# Patient Record
Sex: MALE | Race: WHITE | Employment: OTHER | URBAN - METROPOLITAN AREA
[De-identification: names, ages, dates, MRNs, and addresses within clinical notes are randomized per-mention and may not be internally consistent; named-entity substitution may affect disease eponyms.]

---

## 2017-01-19 ENCOUNTER — TELEPHONE (OUTPATIENT)
Dept: PEDIATRICS | Facility: CLINIC | Age: 82
End: 2017-01-19

## 2017-01-19 ENCOUNTER — OFFICE VISIT (OUTPATIENT)
Dept: PEDIATRICS | Facility: CLINIC | Age: 82
End: 2017-01-19
Payer: COMMERCIAL

## 2017-01-19 VITALS
OXYGEN SATURATION: 98 % | BODY MASS INDEX: 24.59 KG/M2 | HEIGHT: 69 IN | DIASTOLIC BLOOD PRESSURE: 71 MMHG | HEART RATE: 87 BPM | WEIGHT: 166 LBS | SYSTOLIC BLOOD PRESSURE: 117 MMHG | TEMPERATURE: 96.8 F

## 2017-01-19 DIAGNOSIS — T83.511A URINARY TRACT INFECTION ASSOCIATED WITH INDWELLING URETHRAL CATHETER, INITIAL ENCOUNTER (H): Primary | ICD-10-CM

## 2017-01-19 DIAGNOSIS — R30.0 DYSURIA: ICD-10-CM

## 2017-01-19 DIAGNOSIS — R39.9 UTI SYMPTOMS: ICD-10-CM

## 2017-01-19 DIAGNOSIS — N39.0 URINARY TRACT INFECTION ASSOCIATED WITH INDWELLING URETHRAL CATHETER, INITIAL ENCOUNTER (H): Primary | ICD-10-CM

## 2017-01-19 DIAGNOSIS — R82.90 NONSPECIFIC FINDING ON EXAMINATION OF URINE: ICD-10-CM

## 2017-01-19 DIAGNOSIS — R30.0 DYSURIA: Primary | ICD-10-CM

## 2017-01-19 DIAGNOSIS — N19 RENAL FAILURE: ICD-10-CM

## 2017-01-19 DIAGNOSIS — R53.83 FATIGUE, UNSPECIFIED TYPE: ICD-10-CM

## 2017-01-19 LAB
ALBUMIN UR-MCNC: 100 MG/DL
ANION GAP SERPL CALCULATED.3IONS-SCNC: 9 MMOL/L (ref 3–14)
APPEARANCE UR: ABNORMAL
BACTERIA #/AREA URNS HPF: ABNORMAL /HPF
BASOPHILS # BLD AUTO: 0 10E9/L (ref 0–0.2)
BASOPHILS NFR BLD AUTO: 0.3 %
BILIRUB UR QL STRIP: NEGATIVE
BUN SERPL-MCNC: 47 MG/DL (ref 7–30)
CALCIUM SERPL-MCNC: 9 MG/DL (ref 8.5–10.1)
CHLORIDE SERPL-SCNC: 110 MMOL/L (ref 94–109)
CO2 SERPL-SCNC: 21 MMOL/L (ref 20–32)
COLOR UR AUTO: YELLOW
CREAT SERPL-MCNC: 3.68 MG/DL (ref 0.66–1.25)
DIFFERENTIAL METHOD BLD: ABNORMAL
EOSINOPHIL # BLD AUTO: 0.1 10E9/L (ref 0–0.7)
EOSINOPHIL NFR BLD AUTO: 1.1 %
ERYTHROCYTE [DISTWIDTH] IN BLOOD BY AUTOMATED COUNT: 14 % (ref 10–15)
GFR SERPL CREATININE-BSD FRML MDRD: 16 ML/MIN/1.7M2
GLUCOSE SERPL-MCNC: 109 MG/DL (ref 70–99)
GLUCOSE UR STRIP-MCNC: NEGATIVE MG/DL
HCT VFR BLD AUTO: 38.5 % (ref 40–53)
HGB BLD-MCNC: 12.5 G/DL (ref 13.3–17.7)
HGB UR QL STRIP: ABNORMAL
KETONES UR STRIP-MCNC: NEGATIVE MG/DL
LEUKOCYTE ESTERASE UR QL STRIP: ABNORMAL
LYMPHOCYTES # BLD AUTO: 0.8 10E9/L (ref 0.8–5.3)
LYMPHOCYTES NFR BLD AUTO: 12.8 %
MCH RBC QN AUTO: 29.2 PG (ref 26.5–33)
MCHC RBC AUTO-ENTMCNC: 32.5 G/DL (ref 31.5–36.5)
MCV RBC AUTO: 90 FL (ref 78–100)
MONOCYTES # BLD AUTO: 1.1 10E9/L (ref 0–1.3)
MONOCYTES NFR BLD AUTO: 17.8 %
NEUTROPHILS # BLD AUTO: 4.2 10E9/L (ref 1.6–8.3)
NEUTROPHILS NFR BLD AUTO: 68 %
NITRATE UR QL: NEGATIVE
PH UR STRIP: 6 PH (ref 5–7)
PLATELET # BLD AUTO: 182 10E9/L (ref 150–450)
POTASSIUM SERPL-SCNC: 4.4 MMOL/L (ref 3.4–5.3)
RBC # BLD AUTO: 4.28 10E12/L (ref 4.4–5.9)
RBC #/AREA URNS AUTO: ABNORMAL /HPF (ref 0–2)
SODIUM SERPL-SCNC: 140 MMOL/L (ref 133–144)
SP GR UR STRIP: 1.01 (ref 1–1.03)
URN SPEC COLLECT METH UR: ABNORMAL
UROBILINOGEN UR STRIP-MCNC: NORMAL MG/DL (ref 0–2)
WBC # BLD AUTO: 6.2 10E9/L (ref 4–11)
WBC #/AREA URNS AUTO: >100 /HPF (ref 0–2)
WBC CLUMPS #/AREA URNS HPF: PRESENT /HPF

## 2017-01-19 PROCEDURE — 85025 COMPLETE CBC W/AUTO DIFF WBC: CPT | Performed by: INTERNAL MEDICINE

## 2017-01-19 PROCEDURE — 80048 BASIC METABOLIC PNL TOTAL CA: CPT | Performed by: INTERNAL MEDICINE

## 2017-01-19 PROCEDURE — 87088 URINE BACTERIA CULTURE: CPT | Performed by: INTERNAL MEDICINE

## 2017-01-19 PROCEDURE — 81001 URINALYSIS AUTO W/SCOPE: CPT | Performed by: INTERNAL MEDICINE

## 2017-01-19 PROCEDURE — 87086 URINE CULTURE/COLONY COUNT: CPT | Performed by: INTERNAL MEDICINE

## 2017-01-19 PROCEDURE — 99213 OFFICE O/P EST LOW 20 MIN: CPT | Mod: 25 | Performed by: INTERNAL MEDICINE

## 2017-01-19 PROCEDURE — 36415 COLL VENOUS BLD VENIPUNCTURE: CPT | Performed by: INTERNAL MEDICINE

## 2017-01-19 PROCEDURE — 87186 SC STD MICRODIL/AGAR DIL: CPT | Performed by: INTERNAL MEDICINE

## 2017-01-19 RX ORDER — CIPROFLOXACIN 250 MG/1
250 TABLET, FILM COATED ORAL DAILY
Qty: 10 TABLET | Refills: 0 | Status: SHIPPED | OUTPATIENT
Start: 2017-01-19 | End: 2017-01-29

## 2017-01-19 NOTE — NURSING NOTE
"Chief Complaint   Patient presents with     UTI       Initial /71 mmHg  Pulse 87  Temp(Src) 96.8  F (36  C) (Oral)  Ht 5' 8.5\" (1.74 m)  Wt 166 lb (75.297 kg)  BMI 24.87 kg/m2  SpO2 98% Estimated body mass index is 24.87 kg/(m^2) as calculated from the following:    Height as of this encounter: 5' 8.5\" (1.74 m).    Weight as of this encounter: 166 lb (75.297 kg).  BP completed using cuff size: shaquille Moseley CMA    "

## 2017-01-19 NOTE — PROGRESS NOTES
SUBJECTIVE:                                                    Cam Gonzalez is a 86 year old male who presents to clinic today for the following health issues:      Genitourinary - Male     Onset: Yesterday     Description:   Dysuria Painful uses cath   Hematuria (blood in urine): no   Frequency: YES  Are you urinating at night : Yes- night before last night  Hesitancy (delay in urine): no   Retention (unable to empty): YES- pt. States it wont empty completely   Decrease in urinary flow: no   Incontinence: no     Progression of Symptoms:  improving    Accompanying Signs & Symptoms:  Fever: no   Back/Flank pain: no   Urethral discharge: no   Testicle lumps/masses/pain: no   Nausea and/or vomiting: no   Abdominal pain: no    History:   History of frequent UTI's: no   History of kidney stones: no   History of hernias: no   Personal or Family history of Prostate problems: no  Sexually active: no            Therapies Tried and outcome: None      HPI: This gentleman self caths and noted discomfort and foul-smelling urine. No nausea no vomiting no fever no back pain    PMH:   Patient Active Problem List   Diagnosis     Renal failure     Syncope, unspecified syncope type     Acute renal failure, unspecified acute renal failure type (H)     Bilateral hydronephrosis     Asymptomatic cholelithiasis     Enlarged prostate     Mild concentric left ventricular hypertrophy (LVH)     Anemia of chronic disease     Vitamin D deficiency     Urinary retention     Elevated blood pressure reading without diagnosis of hypertension     Carotid stenosis, asymptomatic, bilateral     Sinus bradycardia     HTN (hypertension)     Second degree AV block, Mobitz type II     S/P placement of cardiac pacemaker     Syncope and collapse     CKD (chronic kidney disease) stage 4, GFR 15-29 ml/min (H)     Obstructive uropathy     ACP (advance care planning)     Hill catheter in place     S/P TURP (status post transurethral resection of  "prostate)     History reviewed. No pertinent past surgical history.    Social History   Substance Use Topics     Smoking status: Former Smoker     Quit date: 01/01/1990     Smokeless tobacco: Not on file     Alcohol Use: No      Comment: rarely has a beer     Family History   Problem Relation Age of Onset     Prostate Cancer Father          No Known Allergies        OBJECTIVE:                                                    /71 mmHg  Pulse 87  Temp(Src) 96.8  F (36  C) (Oral)  Ht 5' 8.5\" (1.74 m)  Wt 166 lb (75.297 kg)  BMI 24.87 kg/m2  SpO2 98%  Body mass index is 24.87 kg/(m^2).  GENERAL: healthy, alert and no distress  NEURO: Normal strength and tone, mentation intact and speech normal  BACK: no CVA tenderness, no paralumbar tenderness  PSYCH: mentation appears normal, affect normal/bright    Diagnostic Test Results:  Results for orders placed or performed in visit on 01/19/17 (from the past 24 hour(s))   CBC with platelets and differential   Result Value Ref Range    WBC 6.2 4.0 - 11.0 10e9/L    RBC Count 4.28 (L) 4.4 - 5.9 10e12/L    Hemoglobin 12.5 (L) 13.3 - 17.7 g/dL    Hematocrit 38.5 (L) 40.0 - 53.0 %    MCV 90 78 - 100 fl    MCH 29.2 26.5 - 33.0 pg    MCHC 32.5 31.5 - 36.5 g/dL    RDW 14.0 10.0 - 15.0 %    Platelet Count 182 150 - 450 10e9/L    Diff Method Automated Method     % Neutrophils 68.0 %    % Lymphocytes 12.8 %    % Monocytes 17.8 %    % Eosinophils 1.1 %    % Basophils 0.3 %    Absolute Neutrophil 4.2 1.6 - 8.3 10e9/L    Absolute Lymphocytes 0.8 0.8 - 5.3 10e9/L    Absolute Monocytes 1.1 0.0 - 1.3 10e9/L    Absolute Eosinophils 0.1 0.0 - 0.7 10e9/L    Absolute Basophils 0.0 0.0 - 0.2 10e9/L   Basic metabolic panel  (Ca, Cl, CO2, Creat, Gluc, K, Na, BUN)   Result Value Ref Range    Sodium 140 133 - 144 mmol/L    Potassium 4.4 3.4 - 5.3 mmol/L    Chloride 110 (H) 94 - 109 mmol/L    Carbon Dioxide 21 20 - 32 mmol/L    Anion Gap 9 3 - 14 mmol/L    Glucose 109 (H) 70 - 99 mg/dL    " Urea Nitrogen 47 (H) 7 - 30 mg/dL    Creatinine 3.68 (H) 0.66 - 1.25 mg/dL    GFR Estimate 16 (L) >60 mL/min/1.7m2    GFR Estimate If Black 19 (L) >60 mL/min/1.7m2    Calcium 9.0 8.5 - 10.1 mg/dL   UA with Microscopic reflex to Culture ([place])   Result Value Ref Range    Color Urine Yellow     Appearance Urine Cloudy     Glucose Urine Negative NEG mg/dL    Bilirubin Urine Negative NEG    Ketones Urine Negative NEG mg/dL    Specific Gravity Urine 1.015 1.003 - 1.035    Blood Urine Moderate (A) NEG    pH Urine 6.0 5.0 - 7.0 pH    Protein Albumin Urine 100 (A) NEG mg/dL    Urobilinogen mg/dL Normal 0.0 - 2.0 mg/dL    Nitrite Urine Negative NEG    Leukocyte Esterase Urine Large (A) NEG    Source Midstream Urine     WBC Urine >100 (A) 0 - 2 /HPF    RBC Urine 2-5 (A) 0 - 2 /HPF    WBC Clumps Present (A) NEG /HPF    Bacteria Urine Many (A) NEG /HPF        ASSESSMENT/PLAN:                                                    1. Urinary tract infection associated with indwelling urethral catheter, initial encounter    Uncomplicated urinary tract infection. Dose of Cipro adjusted for renal failure. Has appointment with urology on Monday.    - ciprofloxacin (CIPRO) 250 MG tablet; Take 1 tablet (250 mg) by mouth daily for 10 days  Dispense: 10 tablet; Refill: 0    2. Renal failure      3. Fatigue, unspecified type    - Basic metabolic panel  (Ca, Cl, CO2, Creat, Gluc, K, Na, BUN)    4. UTI symptoms      5. Dysuria    - CBC with platelets and differential  - UA with Microscopic reflex to Culture ([place])    6. Nonspecific finding on examination of urine    - Urine Culture Aerobic Bacterial    Will call or return to clinic if worsening or symptoms not improving as discussed.  See Patient Instructions    Mati Hurst MD  [facility]

## 2017-01-19 NOTE — TELEPHONE ENCOUNTER
[facility] Call Center    Phone Message    Name of Caller: Meagan    Phone Number: [phone]      Best time to return call: any    May a detailed message be left on voicemail: yes    Relation to patient: Self    Reason for Call: Patients daughter stated her dad has UTI symptoms, cloudy urine/odor, abdominal pain, asking if he can come in and leave a urine sample, self cathed,    Action Taken: Message routed to:  Primary Care p [phone]

## 2017-01-19 NOTE — LETTER
April 27, 2017      Cam Gonzalez  [address]  [place] [facility] [address]              Dear aCm,    In order to ensure that we are providing the best quality care, we would like to remind you that you are due for a lab appointment for the following labs UA and CBC that was recently ordered by . Please let us know if you have any questions and we would be happy to help.     Thank you for trusting us with your care.    Sincerely,     [facility]  [phone]

## 2017-01-19 NOTE — TELEPHONE ENCOUNTER
Date: 1/19/2017    Time of Call: 10:30 AM     Diagnosis:  Dysuria/symptoms of UTI     [ TORB ] Ordering provider: Dr. Hurst  Order: Please order CBC with diff and UA with micro     Order received by: Leydi Pickens RN       Follow-up/additional notes: Patient notified of labs prior to appointment.

## 2017-01-19 NOTE — TELEPHONE ENCOUNTER
Patient's daughter calling today to discuss concerns she has about her father.  She states that yesterday her dad called stating he wasn't feeling well.  Daughter, Meagan, states that her father complained of cloudy urine and a fowl odor.  Patient self cath's.  Patient denies fever or chills at this time.  Daughter mentioned that he did complain of some lower abdominal discomfort and back pain.   Denies nausea or vomiting.  Patient scheduled to be seen in clinic with Dr. Hurst today at 2:40 pm to further assess urinary symptoms.    Will route to provider to review.      Dr. Hurst, would you like lab orders placed for UA/UC prior to your office visit today?

## 2017-01-23 ENCOUNTER — TELEPHONE (OUTPATIENT)
Dept: PEDIATRICS | Facility: CLINIC | Age: 82
End: 2017-01-23

## 2017-01-23 ENCOUNTER — TRANSFERRED RECORDS (OUTPATIENT)
Dept: HEALTH INFORMATION MANAGEMENT | Facility: CLINIC | Age: 82
End: 2017-01-23

## 2017-01-23 LAB
BACTERIA SPEC CULT: ABNORMAL
MICRO REPORT STATUS: ABNORMAL
MICROORGANISM SPEC CULT: ABNORMAL
SPECIMEN SOURCE: ABNORMAL

## 2017-01-23 NOTE — TELEPHONE ENCOUNTER
Notes Recorded by Deborah Bautista APRN CNP on 1/23/2017 at 7:55 AM  Please call   -Urine culture is abnormal and grew out bacteria that are sensitive to the antibiotic you have been given.  Complete the medication as prescribed and if you experience new, worsening or persistent symptoms, you should call or return for a recheck.    Patient Contact    Attempt # 1    Was call answered?  No.  Left message on daughters home/cell number to return call to clinic.  Carlos Sanders, CMA

## 2017-01-24 NOTE — TELEPHONE ENCOUNTER
Phone number listed for patient is patient's daughter's, Meagan, phone number. No phone number listed for patient. No authorization to discuss with daughter on file.   Notified patient's daughter, Meagan, of lab result below. Daughter stated understanding and will have patient continue Cipro antibiotic that was given on 01/19/17.  Marci Blackman, [facility]

## 2017-02-24 ENCOUNTER — TRANSFERRED RECORDS (OUTPATIENT)
Dept: HEALTH INFORMATION MANAGEMENT | Facility: CLINIC | Age: 82
End: 2017-02-24

## 2017-02-28 ENCOUNTER — TRANSFERRED RECORDS (OUTPATIENT)
Dept: HEALTH INFORMATION MANAGEMENT | Facility: CLINIC | Age: 82
End: 2017-02-28

## 2017-03-23 ENCOUNTER — OFFICE VISIT (OUTPATIENT)
Dept: PEDIATRICS | Facility: CLINIC | Age: 82
End: 2017-03-23
Payer: COMMERCIAL

## 2017-03-23 ENCOUNTER — TELEPHONE (OUTPATIENT)
Dept: PEDIATRICS | Facility: CLINIC | Age: 82
End: 2017-03-23

## 2017-03-23 VITALS
BODY MASS INDEX: 23.77 KG/M2 | HEART RATE: 107 BPM | WEIGHT: 160.5 LBS | SYSTOLIC BLOOD PRESSURE: 114 MMHG | DIASTOLIC BLOOD PRESSURE: 73 MMHG | TEMPERATURE: 97.3 F | HEIGHT: 69 IN | OXYGEN SATURATION: 97 %

## 2017-03-23 DIAGNOSIS — N18.4 CKD (CHRONIC KIDNEY DISEASE) STAGE 4, GFR 15-29 ML/MIN (H): ICD-10-CM

## 2017-03-23 DIAGNOSIS — R19.7 DIARRHEA OF PRESUMED INFECTIOUS ORIGIN: Primary | ICD-10-CM

## 2017-03-23 LAB
C DIFF TOX B STL QL: ABNORMAL
SPECIMEN SOURCE: ABNORMAL

## 2017-03-23 PROCEDURE — 99213 OFFICE O/P EST LOW 20 MIN: CPT | Performed by: INTERNAL MEDICINE

## 2017-03-23 PROCEDURE — 87493 C DIFF AMPLIFIED PROBE: CPT | Performed by: INTERNAL MEDICINE

## 2017-03-23 NOTE — NURSING NOTE
"Chief Complaint   Patient presents with     Hospital F/U       Initial /73 (BP Location: Right arm, Patient Position: Chair, Cuff Size: Adult Regular)  Pulse 107  Temp 97.3  F (36.3  C) (Temporal)  Ht 5' 8.5\" (1.74 m)  Wt 160 lb 8 oz (72.8 kg)  SpO2 97%  BMI 24.05 kg/m2 Estimated body mass index is 24.05 kg/(m^2) as calculated from the following:    Height as of this encounter: 5' 8.5\" (1.74 m).    Weight as of this encounter: 160 lb 8 oz (72.8 kg).  Medication Reconciliation: complete     Angely Moseley, SHIRLEY    "

## 2017-03-23 NOTE — PATIENT INSTRUCTIONS
Treating Diarrhea  Diarrhea occurs when you have loose, watery, or frequent bowel movements. It is a common problem with many causes. Most cases of diarrhea clear up on their own. But certain cases may need treatment. Be sure to see your health care provider if your symptoms do not improve within a few days.    Getting Relief  Treatment of diarrhea depends on its cause. Diarrhea caused by bacterial or parasite infection is often treated with antibiotics. Diarrhea caused by other factors, such as a stomach virus, often improves with simple home treatment. The tips below may also help relieve your symptoms.    Drink plenty of fluids. This helps prevent too much fluid loss (dehydration). Water, clear soups, and electrolyte solutions are good choices. Avoid alcohol, coffee, tea, and milk. These can make symptoms worse.    Suck on ice chips if drinking makes you queasy.    Return to your normal diet slowly. You may want to eat bland foods at first, such as rice and toast. Also, you may need to avoid certain foods for a while, such as dairy products. These can make symptoms worse. Ask your health care provider if there are any other foods you should avoid.    If you were prescribed antibiotics, take them as directed.    Do not take anti-diarrhea medications without asking your health care provider first.  Call Your Health Care Provider If You Have:    Fever of 102 F (38.0 C) or higher    Severe pain    Worsening diarrhea or diarrhea for more than 2 days    Bloody vomit or stool    Signs of dehydration (dizziness, dry mouth and tongue, rapid pulse, dark urine)    4030-8663 [employer]. [address] [place] [address], [place] [address]. All rights reserved. This information is not intended as a substitute for professional medical care. Always follow your healthcare professional's instructions.      About C. diff Infection  For Patients, Family and Visitors  What is C. diff (clostridium difficile)?  C. diff are germs  (bacteria). These germs can live in the guts of healthy people. Antibiotic medicine can change the balance of germs in the gut, causing C. diff infection and diarrhea (dye-uh-ESTELLA-deborah).  You can also get C. diff infection during a hospital stay, after surgery, or if you have a weak immune system or IBD (inflammatory bowel disease).  What are the symptoms?  If you have these symptoms, your doctor will ask for a stool (poop) sample for testing:    Diarrhea (loose, watery stools)    Belly pain, tenderness and cramping    Fever  How does it spread?  C. diff leaves your body through your stool. You can get infected when :  1. You touch a surface that has this germ, then  2. You touch food or something else that you put in your mouth.  Here's how you, your family and visitors can help prevent the infection from spreading:     Wash hands often, especially in the hospital, after using the bathroom and before you eat.    Use antibiotics only when you need them. Don't ask for them if your doctor says you have a virus.    If you take antibiotics, follow the directions. Finish all the pills, even if you feel better.    If you have C. diff infection, try to use a separate restroom until you are well.    At home, clean countertops, sinks, faucets, bathroom doorknobs and toilets often. Use warm water with soap or cleaning products with bleach. (Don't use pure bleach. It's too strong.)    In the hospital, your care team should wear gloves and gowns. They should clean their hands before touching you and before leaving the room. If they don't, please remind them.  Hand washing  For this illness, soap and water works better than hand .    Wash hands with warm water and plenty of soap. Wash for 15 to 20 seconds.    Clean under nails, between fingers and up the wrists.    Rinse hands, letting water run down your fingers.    Dry hands well. Use a paper towel to turn off the faucet and open the door.   How is it treated?  Your  doctor may change your antibiotics and give you medicine for diarrhea. Don't take other anti-diarrhea medicines. They will make things worse.  You may get extra fluids through an IV (small tube in the arm or hand).  Sometimes, the infection will come back. If you have symptoms, please call your doctor.   For informational purposes only. Not to replace the advice of your health care provider. Copyright   2014 [employer]. All rights reserved. [employer] 857227 - REV 05/16.

## 2017-03-23 NOTE — MR AVS SNAPSHOT
After Visit Summary   3/23/2017    Cam Gonzalez    MRN: [id number]           Patient Information     Date Of Birth          2/5/1930        Visit Information        Provider Department      3/23/2017 9:00 AM Mati Hurst MD [facility]        Today's Diagnoses     Diarrhea of presumed infectious origin    -  1      Care Instructions      Treating Diarrhea  Diarrhea occurs when you have loose, watery, or frequent bowel movements. It is a common problem with many causes. Most cases of diarrhea clear up on their own. But certain cases may need treatment. Be sure to see your health care provider if your symptoms do not improve within a few days.    Getting Relief  Treatment of diarrhea depends on its cause. Diarrhea caused by bacterial or parasite infection is often treated with antibiotics. Diarrhea caused by other factors, such as a stomach virus, often improves with simple home treatment. The tips below may also help relieve your symptoms.    Drink plenty of fluids. This helps prevent too much fluid loss (dehydration). Water, clear soups, and electrolyte solutions are good choices. Avoid alcohol, coffee, tea, and milk. These can make symptoms worse.    Suck on ice chips if drinking makes you queasy.    Return to your normal diet slowly. You may want to eat bland foods at first, such as rice and toast. Also, you may need to avoid certain foods for a while, such as dairy products. These can make symptoms worse. Ask your health care provider if there are any other foods you should avoid.    If you were prescribed antibiotics, take them as directed.    Do not take anti-diarrhea medications without asking your health care provider first.  Call Your Health Care Provider If You Have:    Fever of 102 F (38.0 C) or higher    Severe pain    Worsening diarrhea or diarrhea for more than 2 days    Bloody vomit or stool    Signs of dehydration (dizziness, dry mouth and tongue, rapid  pulse, dark urine)    2297-4620 [employer]. [address] [place] [address], [place] [address]. All rights reserved. This information is not intended as a substitute for professional medical care. Always follow your healthcare professional's instructions.      About C. diff Infection  For Patients, Family and Visitors  What is C. diff (clostridium difficile)?  C. diff are germs (bacteria). These germs can live in the guts of healthy people. Antibiotic medicine can change the balance of germs in the gut, causing C. diff infection and diarrhea (dye-uh-REE-yuh).  You can also get C. diff infection during a hospital stay, after surgery, or if you have a weak immune system or IBD (inflammatory bowel disease).  What are the symptoms?  If you have these symptoms, your doctor will ask for a stool (poop) sample for testing:    Diarrhea (loose, watery stools)    Belly pain, tenderness and cramping    Fever  How does it spread?  C. diff leaves your body through your stool. You can get infected when :  1. You touch a surface that has this germ, then  2. You touch food or something else that you put in your mouth.  Here's how you, your family and visitors can help prevent the infection from spreading:     Wash hands often, especially in the hospital, after using the bathroom and before you eat.    Use antibiotics only when you need them. Don't ask for them if your doctor says you have a virus.    If you take antibiotics, follow the directions. Finish all the pills, even if you feel better.    If you have C. diff infection, try to use a separate restroom until you are well.    At home, clean countertops, sinks, faucets, bathroom doorknobs and toilets often. Use warm water with soap or cleaning products with bleach. (Don't use pure bleach. It's too strong.)    In the hospital, your care team should wear gloves and gowns. They should clean their hands before touching you and before leaving the room. If they don't, please  remind them.  Hand washing  For this illness, soap and water works better than hand .    Wash hands with warm water and plenty of soap. Wash for 15 to 20 seconds.    Clean under nails, between fingers and up the wrists.    Rinse hands, letting water run down your fingers.    Dry hands well. Use a paper towel to turn off the faucet and open the door.   How is it treated?  Your doctor may change your antibiotics and give you medicine for diarrhea. Don't take other anti-diarrhea medicines. They will make things worse.  You may get extra fluids through an IV (small tube in the arm or hand).  Sometimes, the infection will come back. If you have symptoms, please call your doctor.   For informational purposes only. Not to replace the advice of your health care provider. Copyright   2014 [employer]. All rights reserved. [employer] [id number] - REV 05/16.          Follow-ups after your visit        Follow-up notes from your care team     Return in about 1 month (around 4/23/2017).      Who to contact     If you have questions or need follow up information about today's clinic visit or your schedule please contact [facility] directly at [phone].  Normal or non-critical lab and imaging results will be communicated to you by MyChart, letter or phone within 4 business days after the clinic has received the results. If you do not hear from us within 7 days, please contact the clinic through [employer] or phone. If you have a critical or abnormal lab result, we will notify you by phone as soon as possible.  Submit refill requests through [employer] or call your pharmacy and they will forward the refill request to us. Please allow 3 business days for your refill to be completed.          Additional Information About Your Visit        [employer] Information     [employer] is an electronic gateway that provides easy, online access to your medical records. With [employer] you can request a clinic  "appointment, read your test results, renew a prescription or communicate with your care team.     To sign up for [employer] visit the website at [email or web address]   You will be asked to enter the access code listed below, as well as some personal information. Please follow the directions to create your username and password.     Your access code is: [email or web address]  Expires: 2017  9:37 AM     Your access code will  in 90 days. If you need help or a new code, please contact your [facility] or call [phone] for assistance.        Care EveryWhere ID     This is your Care EveryWhere ID. This could be used by other organizations to access your [place] medical records  [id number]        Your Vitals Were     Pulse Temperature Height Pulse Oximetry BMI (Body Mass Index)       107 97.3  F (36.3  C) (Temporal) 5' 8.5\" (1.74 m) 97% 24.05 kg/m2        Blood Pressure from Last 3 Encounters:   17 114/73   17 117/71   16 120/58    Weight from Last 3 Encounters:   17 160 lb 8 oz (72.8 kg)   17 166 lb (75.3 kg)   16 161 lb 6.4 oz (73.2 kg)              We Performed the Following     Clostridium difficile Toxin B PCR        Primary Care Provider Office Phone # Fax #    Jacquelin Cueto [phone]       [facility] [address] [facility] [address]        Thank you!     Thank you for choosing [facility]  for your care. Our goal is always to provide you with excellent care. Hearing back from our patients is one way we can continue to improve our services. Please take a few minutes to complete the written survey that you may receive in the mail after your visit with us. Thank you!             Your Updated Medication List - Protect others around you: Learn how to safely use, store and throw away your medicines at www.disposemymeds.org.          This list is accurate as of: 3/23/17  9:37 AM.  " Always use your most recent med list.                   Brand Name Dispense Instructions for use    ferrous sulfate 325 (65 FE) MG tablet    IRON    90 tablet    Take 1 tablet (325 mg) by mouth every other day       VITAMIN D3 PO      Take 2,000 Units by mouth daily

## 2017-03-23 NOTE — PROGRESS NOTES
SUBJECTIVE:                                                    Cam Gonzalez is a 87 year old male who presents to clinic today for the following health issues:      ED/UC Followup:    Facility:  [facility]   Date of visit: 2/28-3/3/2017  Reason for visit: Had a bladder infection and was put on an antibiotic a week before and it worsened and had diarrhea , diagnosed with Cdiff  Current Status: Pt. States it varies someday's better someday's worse     Pt. States he did bring a stool sample in today if needed.    HPI: This 87-year-old man returns after recent hospital hospitalization for urinary tract infection which was complicated by an episode of C. difficile infection treated with Flagyl. He feels better now but still having one to 2 loose stools daily    PMH:   Patient Active Problem List   Diagnosis     Renal failure     Syncope, unspecified syncope type     Acute renal failure, unspecified acute renal failure type (H)     Bilateral hydronephrosis     Asymptomatic cholelithiasis     Enlarged prostate     Mild concentric left ventricular hypertrophy (LVH)     Anemia of chronic disease     Vitamin D deficiency     Urinary retention     Elevated blood pressure reading without diagnosis of hypertension     Carotid stenosis, asymptomatic, bilateral     Sinus bradycardia     HTN (hypertension)     Second degree AV block, Mobitz type II     S/P placement of cardiac pacemaker     Syncope and collapse     CKD (chronic kidney disease) stage 4, GFR 15-29 ml/min (H)     Obstructive uropathy     ACP (advance care planning)     Hill catheter in place     S/P TURP (status post transurethral resection of prostate)     History reviewed. No pertinent surgical history.    Social History   Substance Use Topics     Smoking status: Former Smoker     Quit date: 1/1/1990     Smokeless tobacco: Not on file     Alcohol use No      Comment: rarely has a beer     Family History   Problem Relation Age of Onset      "Prostate Cancer Father          Current Outpatient Prescriptions   Medication Sig Dispense Refill     Cholecalciferol (VITAMIN D3 PO) Take 2,000 Units by mouth daily       ferrous sulfate (IRON) 325 (65 FE) MG tablet Take 1 tablet (325 mg) by mouth every other day 90 tablet 3     No Known Allergies  BP Readings from Last 3 Encounters:   03/23/17 114/73   01/19/17 117/71   11/02/16 120/58    Wt Readings from Last 3 Encounters:   03/23/17 160 lb 8 oz (72.8 kg)   01/19/17 166 lb (75.3 kg)   11/02/16 161 lb 6.4 oz (73.2 kg)                    ROS:  C: NEGATIVE for fever, chills, change in weight  CV: No lightheadedness.    OBJECTIVE:                                                    /73 (BP Location: Right arm, Patient Position: Chair, Cuff Size: Adult Regular)  Pulse 107  Temp 97.3  F (36.3  C) (Temporal)  Ht 5' 8.5\" (1.74 m)  Wt 160 lb 8 oz (72.8 kg)  SpO2 97%  BMI 24.05 kg/m2  Body mass index is 24.05 kg/(m^2).     GENERAL: healthy, alert and no distress  His blood pressure was 100/70 right arm sitting and 95/71 minute and 3 minutes after standing.     Diagnostic Test Results:  none      ASSESSMENT/PLAN:                                                        ICD-10-CM    1. Diarrhea of presumed infectious origin A09 Clostridium difficile Toxin B PCR   2. CKD (chronic kidney disease) stage 4, GFR 15-29 ml/min (H) N18.4      Overall he is doing quite well. Will recheck the stool for C. difficile discussed the importance of hydration. Discussed adding rice to his diet. A total of 15 minutes was spent face to face with this patient. More than 50% of the time was spent on education for the above problems and management plans      Will call or return to clinic if worsening or symptoms not improving as discussed.  See Patient Instructions      Mati Hurst MD  [facility]      "

## 2017-03-23 NOTE — TELEPHONE ENCOUNTER
Left message with daughter that stool will have to be collected in a sterile container and unable to use the one they brought into clinic. Stated to come to clinic to  sterile supplies for collection.    Angely Moseley, CMA

## 2017-03-24 DIAGNOSIS — A04.72 C. DIFFICILE DIARRHEA: Primary | ICD-10-CM

## 2017-03-24 DIAGNOSIS — R19.7 DIARRHEA OF PRESUMED INFECTIOUS ORIGIN: ICD-10-CM

## 2017-03-24 LAB
C DIFF TOX B STL QL: ABNORMAL
SPECIMEN SOURCE: ABNORMAL

## 2017-03-24 PROCEDURE — 87493 C DIFF AMPLIFIED PROBE: CPT | Performed by: INTERNAL MEDICINE

## 2017-03-24 NOTE — TELEPHONE ENCOUNTER
Notes Recorded by Carlos Sanders CMA on 3/24/2017 at 1:13 PM  Called Meagan and informed. They already picked up the new collection kit.  Carlos Sanders CMA    ------    Notes Recorded by Mati Hurst MD on 3/23/2017 at 2:30 PM  Please call patient's daughter and have her obtain the proper container for stool specimen for C. difficile

## 2017-03-26 RX ORDER — METRONIDAZOLE 500 MG/1
500 TABLET ORAL 3 TIMES DAILY
Qty: 30 TABLET | Refills: 0 | Status: SHIPPED | OUTPATIENT
Start: 2017-03-26 | End: 2017-04-05

## 2017-03-27 ENCOUNTER — TELEPHONE (OUTPATIENT)
Dept: PEDIATRICS | Facility: CLINIC | Age: 82
End: 2017-03-27

## 2017-03-27 NOTE — TELEPHONE ENCOUNTER
Notes Recorded by Deborah Bautista APRN CNP on 3/26/2017 at 7:12 PM  Please let patient know Cdiff was positive  And will send in medication to treat this.   Please follow up with Dr. Cueto after being treated or sooner if worsening of symptoms

## 2017-03-27 NOTE — TELEPHONE ENCOUNTER
Phone number on file is the phone number of patient's daughter, Meagan. No authorization to speak with patient's daughter on file. Routing message to PCP as an FYI.  Marci Blackman CMA

## 2017-03-27 NOTE — TELEPHONE ENCOUNTER
Left message for patients daughter Meagan to call back asap regarding lab results on patient.    Stephanie Davis CMA

## 2017-03-27 NOTE — TELEPHONE ENCOUNTER
If that is an emergency contact, I would recommend to update her so treatment could be started right away  COLLIN for authorisation can be mailed or picked up by daughter

## 2017-03-27 NOTE — TELEPHONE ENCOUNTER
Patients daughter Meagan advised of lab results/information below per Dr. Cueto.  Meagan states understanding.  Meagan states she is the POA for her father and there is no reason for her to sign a COLLIN because we already have one on file.  She states we have been calling her in regards to her fathers care for years now.      Stephanie Davis CMA

## 2017-03-28 ENCOUNTER — TELEPHONE (OUTPATIENT)
Dept: PEDIATRICS | Facility: CLINIC | Age: 82
End: 2017-03-28

## 2017-03-28 NOTE — TELEPHONE ENCOUNTER
I spoke to the daughter this morning. Flagyl started yesterday and diarrhea is improved today. Since he previously had a course of Flagyl, may need to switch him to vancomycin. Discussed this with the daughter and since he is doing better will have her check in with me in 2 or 3 days

## 2017-04-11 ENCOUNTER — OFFICE VISIT (OUTPATIENT)
Dept: PEDIATRICS | Facility: CLINIC | Age: 82
End: 2017-04-11
Payer: COMMERCIAL

## 2017-04-11 VITALS
SYSTOLIC BLOOD PRESSURE: 124 MMHG | HEIGHT: 69 IN | TEMPERATURE: 97.9 F | BODY MASS INDEX: 23.62 KG/M2 | DIASTOLIC BLOOD PRESSURE: 83 MMHG | HEART RATE: 106 BPM | WEIGHT: 159.5 LBS | OXYGEN SATURATION: 98 %

## 2017-04-11 DIAGNOSIS — A49.8 CLOSTRIDIUM DIFFICILE INFECTION: Primary | ICD-10-CM

## 2017-04-11 PROCEDURE — 99212 OFFICE O/P EST SF 10 MIN: CPT | Performed by: INTERNAL MEDICINE

## 2017-04-11 NOTE — NURSING NOTE
"Chief Complaint   Patient presents with     RECHECK       Initial /83 (BP Location: Right arm, Patient Position: Chair, Cuff Size: Adult Regular)  Pulse 106  Temp 97.9  F (36.6  C) (Temporal)  Ht 5' 8.5\" (1.74 m)  Wt 159 lb 8 oz (72.3 kg)  SpO2 98%  BMI 23.9 kg/m2 Estimated body mass index is 23.9 kg/(m^2) as calculated from the following:    Height as of this encounter: 5' 8.5\" (1.74 m).    Weight as of this encounter: 159 lb 8 oz (72.3 kg).  Medication Reconciliation: complete     Angely Moseley CMA    "

## 2017-04-11 NOTE — PROGRESS NOTES
SUBJECTIVE:                                                    Cam Gonzalez is a 87 year old male who presents to clinic today for the following health issues:      Pt. Is present today for a f/u on c-diff per Dr. Hurst request.     Pt. States he is doing good today with no symptoms.    HPI: Diarrhea has resolved after the second course of Flagyl. Asymptomatic at present    PMH:   Patient Active Problem List   Diagnosis     Renal failure     Syncope, unspecified syncope type     Acute renal failure, unspecified acute renal failure type (H)     Bilateral hydronephrosis     Asymptomatic cholelithiasis     Enlarged prostate     Mild concentric left ventricular hypertrophy (LVH)     Anemia of chronic disease     Vitamin D deficiency     Urinary retention     Elevated blood pressure reading without diagnosis of hypertension     Carotid stenosis, asymptomatic, bilateral     Sinus bradycardia     HTN (hypertension)     Second degree AV block, Mobitz type II     S/P placement of cardiac pacemaker     Syncope and collapse     CKD (chronic kidney disease) stage 4, GFR 15-29 ml/min (H)     Obstructive uropathy     ACP (advance care planning)     Hill catheter in place     S/P TURP (status post transurethral resection of prostate)     History reviewed. No pertinent surgical history.    Social History   Substance Use Topics     Smoking status: Former Smoker     Quit date: 1/1/1990     Smokeless tobacco: Not on file     Alcohol use No      Comment: rarely has a beer     Family History   Problem Relation Age of Onset     Prostate Cancer Father          Current Outpatient Prescriptions   Medication Sig Dispense Refill     Cholecalciferol (VITAMIN D3 PO) Take 2,000 Units by mouth daily       ferrous sulfate (IRON) 325 (65 FE) MG tablet Take 1 tablet (325 mg) by mouth every other day 90 tablet 3     No Known Allergies    ROS:  C: NEGATIVE for fever, chills, change in weight  GI: NEGATIVE for diarrhea    OBJECTIVE:           "                                          /83 (BP Location: Right arm, Patient Position: Chair, Cuff Size: Adult Regular)  Pulse 106  Temp 97.9  F (36.6  C) (Temporal)  Ht 5' 8.5\" (1.74 m)  Wt 159 lb 8 oz (72.3 kg)  SpO2 98%  BMI 23.9 kg/m2  Body mass index is 23.9 kg/(m^2).     GENERAL: healthy, alert and no distress  ABDOMEN: soft, nontender, no hepatosplenomegaly, no masses and bowel sounds normal    Diagnostic Test Results:  none      ASSESSMENT/PLAN:                                                    1. Clostridium difficile infection    This is resolved and no further therapy needed at present            Will call or return to clinic if worsening or symptoms not improving as discussed.      Mati Hurst MD  [facility]  "

## 2017-04-13 ENCOUNTER — TELEPHONE (OUTPATIENT)
Dept: PEDIATRICS | Facility: CLINIC | Age: 82
End: 2017-04-13
Payer: COMMERCIAL

## 2017-04-13 DIAGNOSIS — A04.72 COLITIS DUE TO CLOSTRIDIUM DIFFICILE: Primary | ICD-10-CM

## 2017-04-13 LAB
C DIFF TOX B STL QL: ABNORMAL
SPECIMEN SOURCE: ABNORMAL

## 2017-04-13 PROCEDURE — 87493 C DIFF AMPLIFIED PROBE: CPT | Performed by: INTERNAL MEDICINE

## 2017-04-13 NOTE — TELEPHONE ENCOUNTER
Called Meagan Gil and informed.  She states her  is on his way to  the testing supplies.  She agrees to this plan.     Maame Carlos RN, [facility]

## 2017-04-13 NOTE — TELEPHONE ENCOUNTER
Called Meagan Gil back, patient's daughter.  She states at the time of the office visit with Dr. Hurst on 4/11/17, the episode of C-diff seemed to be resolved.     Symptoms returned again yesterday, 5 loose stools, not eating well.  Meagan Gil is encouraging patient to keep drinking fluids.    He has lost 6 lbs since this started back in February.    She is wondering if he should try a different medication or if he should go back on Flagyl.      Routing to provider to please advise.  Pharmacy is loaded.    Maame Carlos RN, [facility]

## 2017-04-13 NOTE — TELEPHONE ENCOUNTER
[facility] Call Center    Phone Message    Name of Caller: shaheen gil    Phone Number: Other phone number:  [phone]    Best time to return call: any    May a detailed message be left on voicemail: yes    Relation to patient: Other Name: daughter  Relationship: daughter   Is there legal documentation in chart to discuss information with this person: No:  Gather information or concern from the caller.  Document in the note but do NOT release any information to the person(s).  Then send message to appropriate person, as requested by the caller.      Reason for Call: Other: Daughter Shaheen Gil calling Dr Hurst regarding patient C Diff.  Having recurrence. Please call daughter to triage.  number above.      Action Taken: Message routed to:  Primary Care p [id number]

## 2017-04-13 NOTE — TELEPHONE ENCOUNTER
Diarrhea has returned. Need to check stool for C. difficile and if positive will need to start oral vancomycin

## 2017-04-14 ENCOUNTER — TELEPHONE (OUTPATIENT)
Dept: PEDIATRICS | Facility: CLINIC | Age: 82
End: 2017-04-14

## 2017-04-14 DIAGNOSIS — A04.72 COLITIS, CLOSTRIDIUM DIFFICILE: Primary | ICD-10-CM

## 2017-04-14 RX ORDER — VANCOMYCIN HYDROCHLORIDE 250 MG/1
250 CAPSULE ORAL 4 TIMES DAILY
Qty: 40 CAPSULE | Refills: 0 | Status: SHIPPED | OUTPATIENT
Start: 2017-04-14 | End: 2017-05-04

## 2017-04-14 NOTE — TELEPHONE ENCOUNTER
Please inform patient-c. Difficile results from yesterday-positive, prescription sent for 10 days of vancomycin.   Would recommend to start on over-the-counter probiotic once a day for the next 2 months  Patient will follow-up if symptoms recur in 2-3 weeks  Please review hand hygiene, Contagiousness of c-diff

## 2017-04-14 NOTE — TELEPHONE ENCOUNTER
Routing to provider team to please review C-Diff results.  Patient is symptomatic and Dr. Hurst is not in clinic until 4/18/17.  Per his last note:    Mati Hurst MD        4/13/17 11:06 AM   Note      Diarrhea has returned. Need to check stool for C. difficile and if positive will need to start oral vancomycin        Maame Carlos RN, [facility]

## 2017-04-14 NOTE — TELEPHONE ENCOUNTER
[facility] Call Center    Phone Message    Name of Caller: self    Phone Number: Cell number on file:    Telephone Information:   Mobile [phone]       Best time to return call: soon    May a detailed message be left on voicemail: no    Relation to patient: Self    Reason for Call: Requesting Results   Name/type of test: lab/stool sample results  Date of test: 4/13  Was test done at a location other than [facility] (Please fill in the location if not [facility])?: No      Action Taken: Message routed to:  Primary Care p [phone]

## 2017-04-14 NOTE — TELEPHONE ENCOUNTER
Patient was not available he was resting.  Spoke with patients daughter Meagan.  Gave her the results, note from Dr. Cueto below and reviewed hand hygiene contagiousness with daughter to prevent the spread of infection to others.    Fabiola Riley RN,   . [facility], [facility]

## 2017-05-04 ENCOUNTER — OFFICE VISIT (OUTPATIENT)
Dept: PEDIATRICS | Facility: CLINIC | Age: 82
End: 2017-05-04
Payer: COMMERCIAL

## 2017-05-04 VITALS
TEMPERATURE: 97.2 F | WEIGHT: 160.1 LBS | BODY MASS INDEX: 23.71 KG/M2 | HEIGHT: 69 IN | SYSTOLIC BLOOD PRESSURE: 89 MMHG | DIASTOLIC BLOOD PRESSURE: 51 MMHG | OXYGEN SATURATION: 99 % | HEART RATE: 61 BPM

## 2017-05-04 DIAGNOSIS — R19.7 DIARRHEA OF PRESUMED INFECTIOUS ORIGIN: Primary | ICD-10-CM

## 2017-05-04 PROCEDURE — 99213 OFFICE O/P EST LOW 20 MIN: CPT | Performed by: INTERNAL MEDICINE

## 2017-05-04 NOTE — PROGRESS NOTES
"  SUBJECTIVE:                                                    Cam Gonzalez is a 87 year old male who presents to clinic today for the following health issues:      Diarrhea     Onset: 12am 5/4    Description:   Consistency of stool: runny and real dark  Blood in stool: no   Number of loose stools in past 24 hours: 2    Progression of Symptoms:  same    Accompanying Signs & Symptoms:  Fever: no   Nausea or vomiting; no   Abdominal pain: no   Episodes of constipation: no   Weight loss: no    History:   Ill contacts: no   Recent use of antibiotics: no    Recent travels: no          Recent medication-new or changes(Rx or OTC): YES- vitamin d, iron and probiotic     Precipitating factors:   Pt. States he may have fallen at 4am because he felt \"funny\"    Alleviating factors:   None       Therapies Tried and outcome:  None; Outcome: None    Pt. Just finished his C-diff meds last week Saturday (VANCOCIN).      HPI: Patient had 2 semi-6 soft stools in the last 24 hours. Last one was at 4:30 this morning. No nausea no vomiting no abdominal pain and is eating well    PMH:   Patient Active Problem List   Diagnosis     Renal failure     Syncope, unspecified syncope type     Acute renal failure, unspecified acute renal failure type (H)     Bilateral hydronephrosis     Asymptomatic cholelithiasis     Enlarged prostate     Mild concentric left ventricular hypertrophy (LVH)     Anemia of chronic disease     Vitamin D deficiency     Urinary retention     Elevated blood pressure reading without diagnosis of hypertension     Carotid stenosis, asymptomatic, bilateral     Sinus bradycardia     HTN (hypertension)     Second degree AV block, Mobitz type II     S/P placement of cardiac pacemaker     Syncope and collapse     CKD (chronic kidney disease) stage 4, GFR 15-29 ml/min (H)     Obstructive uropathy     ACP (advance care planning)     Hill catheter in place     S/P TURP (status post transurethral resection of prostate)    " " History reviewed. No pertinent surgical history.    Social History   Substance Use Topics     Smoking status: Former Smoker     Quit date: 1/1/1990     Smokeless tobacco: Not on file     Alcohol use No      Comment: rarely has a beer     Family History   Problem Relation Age of Onset     Prostate Cancer Father          Current Outpatient Prescriptions   Medication Sig Dispense Refill     Probiotic Product (PROBIOTIC DAILY PO)        Cholecalciferol (VITAMIN D3 PO) Take 2,000 Units by mouth daily       ferrous sulfate (IRON) 325 (65 FE) MG tablet Take 1 tablet (325 mg) by mouth every other day 90 tablet 3     No Known Allergies  BP Readings from Last 3 Encounters:   05/04/17 (!) 89/51   04/11/17 124/83   03/23/17 114/73    Wt Readings from Last 3 Encounters:   05/04/17 160 lb 1.6 oz (72.6 kg)   04/11/17 159 lb 8 oz (72.3 kg)   03/23/17 160 lb 8 oz (72.8 kg)                    ROS:  C: NEGATIVE for fever, chills, change in weight  Card:. No chest pain No SHORTNESS OF BREATH. No lightheadedness    OBJECTIVE:                                                    BP (!) 89/51  Pulse 61  Temp 97.2  F (36.2  C) (Temporal)  Ht 5' 8.5\" (1.74 m)  Wt 160 lb 1.6 oz (72.6 kg)  SpO2 99%  BMI 23.99 kg/m2  Body mass index is 23.99 kg/(m^2).   /68    GENERAL: healthy, alert and no distress  RESP: lungs clear to auscultation - no rales, rhonchi or wheezes  CV: regular rates and rhythm, normal S1 S2, no S3 or S4 and no murmur, click or rub  ABDOMEN: soft, nontender, no hepatosplenomegaly, no masses and bowel sounds normal    Diagnostic Test Results:  none      ASSESSMENT/PLAN:                                                    1. Diarrhea of presumed infectious origin    I do not think this represents recurrence of Clostridium. I'm encouraged he's had no diarrhea the last 12 hours. If diarrhea returns we'll obtain stool for clostridium        - Clostridium difficile Toxin B PCR; Future        Will call or return to clinic if " worsening or symptoms not improving as discussed.  See Patient Instructions      Mati Hurst MD  [facility]

## 2017-05-04 NOTE — MR AVS SNAPSHOT
After Visit Summary   2017    Cam Gonzalez    MRN: [id number]           Patient Information     Date Of Birth          1930        Visit Information        Provider Department      2017 2:20 PM Mati Hurst MD [facility]        Today's Diagnoses     Diarrhea of presumed infectious origin    -  1       Follow-ups after your visit        Future tests that were ordered for you today     Open Future Orders        Priority Expected Expires Ordered    Clostridium difficile Toxin B PCR Routine  2017            Who to contact     If you have questions or need follow up information about today's clinic visit or your schedule please contact [facility] directly at [phone].  Normal or non-critical lab and imaging results will be communicated to you by MyChart, letter or phone within 4 business days after the clinic has received the results. If you do not hear from us within 7 days, please contact the clinic through MyChart or phone. If you have a critical or abnormal lab result, we will notify you by phone as soon as possible.  Submit refill requests through [employer] or call your pharmacy and they will forward the refill request to us. Please allow 3 business days for your refill to be completed.          Additional Information About Your Visit        MyChart Information     [employer] is an electronic gateway that provides easy, online access to your medical records. With [employer], you can request a clinic appointment, read your test results, renew a prescription or communicate with your care team.     To sign up for [employer] visit the website at [email or web address]   You will be asked to enter the access code listed below, as well as some personal information. Please follow the directions to create your username and password.     Your access code is: [email or web address]  Expires: 2017  9:37 AM     Your access code will  in  "90 days. If you need help or a new code, please contact your [facility] or call [phone] for assistance.        Care EveryWhere ID     This is your Care EveryWhere ID. This could be used by other organizations to access your [place] medical records  [id number]        Your Vitals Were     Pulse Temperature Height Pulse Oximetry BMI (Body Mass Index)       61 97.2  F (36.2  C) (Temporal) 5' 8.5\" (1.74 m) 99% 23.99 kg/m2        Blood Pressure from Last 3 Encounters:   05/04/17 (!) 89/51   04/11/17 124/83   03/23/17 114/73    Weight from Last 3 Encounters:   05/04/17 160 lb 1.6 oz (72.6 kg)   04/11/17 159 lb 8 oz (72.3 kg)   03/23/17 160 lb 8 oz (72.8 kg)               Primary Care Provider Office Phone # Fax #    Jacquelin Cueto [phone]       [facility] [id number] [facility] [address]        Thank you!     Thank you for choosing [facility]  for your care. Our goal is always to provide you with excellent care. Hearing back from our patients is one way we can continue to improve our services. Please take a few minutes to complete the written survey that you may receive in the mail after your visit with us. Thank you!             Your Updated Medication List - Protect others around you: Learn how to safely use, store and throw away your medicines at www.disposemymeds.org.          This list is accurate as of: 5/4/17  3:02 PM.  Always use your most recent med list.                   Brand Name Dispense Instructions for use    ferrous sulfate 325 (65 FE) MG tablet    IRON    90 tablet    Take 1 tablet (325 mg) by mouth every other day       PROBIOTIC DAILY PO          VITAMIN D3 PO      Take 2,000 Units by mouth daily         "

## 2017-05-13 DIAGNOSIS — D63.8 ANEMIA OF CHRONIC DISEASE: Primary | ICD-10-CM

## 2017-05-15 RX ORDER — FERROUS SULFATE 325(65) MG
TABLET ORAL
Qty: 90 TABLET | Refills: 1 | Status: SHIPPED | OUTPATIENT
Start: 2017-05-15 | End: 2018-05-17

## 2017-05-15 NOTE — TELEPHONE ENCOUNTER
Routing refill request to provider for review/approval because:  Labs out of range:  creatinine      iron        Last Written Prescription Date: 4/11/16  Last Fill Quantity: 90,    # refills: 3  Last Office Visit with [facility] or [facility] prescribing provider:  5/4/17      Lab Results   Component Value Date    WBC 6.2 01/19/2017     Lab Results   Component Value Date    RBC 4.28 01/19/2017     Lab Results   Component Value Date    HGB 12.5 01/19/2017     Lab Results   Component Value Date    HCT 38.5 01/19/2017     No components found for: MCT  Lab Results   Component Value Date    MCV 90 01/19/2017     Lab Results   Component Value Date    MCH 29.2 01/19/2017     Lab Results   Component Value Date    MCHC 32.5 01/19/2017     Lab Results   Component Value Date    RDW 14.0 01/19/2017     Lab Results   Component Value Date     01/19/2017     Lab Results   Component Value Date    AST 36 09/21/2016     Lab Results   Component Value Date    ALT 41 09/21/2016     Creatinine   Date Value Ref Range Status   01/19/2017 3.68 (H) 0.66 - 1.25 mg/dL Final                                         Fabiola Riley RN,   [facility]

## 2018-05-29 ENCOUNTER — DOCUMENTATION ONLY (OUTPATIENT)
Dept: LAB | Facility: CLINIC | Age: 83
End: 2018-05-29

## 2018-05-29 NOTE — PROGRESS NOTES
Patient is scheduled to see Dr. Call.  Will need to see him prior to placing lab orders.     Called patient Meagan, rescheduled.    Maame Carlos RN, [facility]

## 2018-05-31 ENCOUNTER — OFFICE VISIT (OUTPATIENT)
Dept: PEDIATRICS | Facility: CLINIC | Age: 83
End: 2018-05-31
Payer: COMMERCIAL

## 2018-05-31 VITALS
TEMPERATURE: 96.9 F | DIASTOLIC BLOOD PRESSURE: 76 MMHG | SYSTOLIC BLOOD PRESSURE: 110 MMHG | OXYGEN SATURATION: 97 % | BODY MASS INDEX: 24.72 KG/M2 | HEIGHT: 69 IN | HEART RATE: 69 BPM | WEIGHT: 166.9 LBS

## 2018-05-31 DIAGNOSIS — Z00.00 ROUTINE GENERAL MEDICAL EXAMINATION AT A HEALTH CARE FACILITY: Primary | ICD-10-CM

## 2018-05-31 LAB
ALBUMIN SERPL-MCNC: 3.4 G/DL (ref 3.4–5)
ALP SERPL-CCNC: 84 U/L (ref 40–150)
ALT SERPL W P-5'-P-CCNC: 18 U/L (ref 0–70)
ANION GAP SERPL CALCULATED.3IONS-SCNC: 8 MMOL/L (ref 3–14)
AST SERPL W P-5'-P-CCNC: 19 U/L (ref 0–45)
BASOPHILS # BLD AUTO: 0 10E9/L (ref 0–0.2)
BASOPHILS NFR BLD AUTO: 0.7 %
BILIRUB SERPL-MCNC: 0.4 MG/DL (ref 0.2–1.3)
BUN SERPL-MCNC: 48 MG/DL (ref 7–30)
CALCIUM SERPL-MCNC: 8.9 MG/DL (ref 8.5–10.1)
CHLORIDE SERPL-SCNC: 110 MMOL/L (ref 94–109)
CO2 SERPL-SCNC: 23 MMOL/L (ref 20–32)
CREAT SERPL-MCNC: 3.03 MG/DL (ref 0.66–1.25)
DIFFERENTIAL METHOD BLD: ABNORMAL
EOSINOPHIL # BLD AUTO: 0.1 10E9/L (ref 0–0.7)
EOSINOPHIL NFR BLD AUTO: 2.3 %
ERYTHROCYTE [DISTWIDTH] IN BLOOD BY AUTOMATED COUNT: 12.8 % (ref 10–15)
GFR SERPL CREATININE-BSD FRML MDRD: 20 ML/MIN/1.7M2
GLUCOSE SERPL-MCNC: 101 MG/DL (ref 70–99)
HCT VFR BLD AUTO: 44.6 % (ref 40–53)
HGB BLD-MCNC: 14.1 G/DL (ref 13.3–17.7)
IMM GRANULOCYTES # BLD: 0 10E9/L (ref 0–0.4)
IMM GRANULOCYTES NFR BLD: 0.3 %
LYMPHOCYTES # BLD AUTO: 0.5 10E9/L (ref 0.8–5.3)
LYMPHOCYTES NFR BLD AUTO: 8.9 %
MCH RBC QN AUTO: 29.9 PG (ref 26.5–33)
MCHC RBC AUTO-ENTMCNC: 31.6 G/DL (ref 31.5–36.5)
MCV RBC AUTO: 95 FL (ref 78–100)
MONOCYTES # BLD AUTO: 0.7 10E9/L (ref 0–1.3)
MONOCYTES NFR BLD AUTO: 11.5 %
NEUTROPHILS # BLD AUTO: 4.7 10E9/L (ref 1.6–8.3)
NEUTROPHILS NFR BLD AUTO: 76.3 %
PLATELET # BLD AUTO: 179 10E9/L (ref 150–450)
POTASSIUM SERPL-SCNC: 4.6 MMOL/L (ref 3.4–5.3)
PROT SERPL-MCNC: 7.3 G/DL (ref 6.8–8.8)
RBC # BLD AUTO: 4.72 10E12/L (ref 4.4–5.9)
SODIUM SERPL-SCNC: 141 MMOL/L (ref 133–144)
WBC # BLD AUTO: 6.1 10E9/L (ref 4–11)

## 2018-05-31 PROCEDURE — 80053 COMPREHEN METABOLIC PANEL: CPT | Performed by: FAMILY MEDICINE

## 2018-05-31 PROCEDURE — 99397 PER PM REEVAL EST PAT 65+ YR: CPT | Performed by: FAMILY MEDICINE

## 2018-05-31 PROCEDURE — 85025 COMPLETE CBC W/AUTO DIFF WBC: CPT | Performed by: FAMILY MEDICINE

## 2018-05-31 PROCEDURE — 36415 COLL VENOUS BLD VENIPUNCTURE: CPT | Performed by: FAMILY MEDICINE

## 2018-05-31 ASSESSMENT — PAIN SCALES - GENERAL: PAINLEVEL: NO PAIN (0)

## 2018-05-31 NOTE — LETTER
May 31, 2018      Cam Gonzalez  [address]  [place] [address]        Dear Cam,     Enclosed are your recent lab results.    No change in this patient's CMP as compared to 1/19/2017 and CBC is normal at this time         Doug Call MD                                                            CMP (Complete Metabolic Panel)        Ref Range & Units 10:13 AM  (5/31/18) 1yr ago  (1/19/17) 1yr ago  (11/2/16)      Sodium 133 - 144 mmol/L 141 140 140      Potassium 3.4 - 5.3 mmol/L 4.6 4.4 4.4      Chloride 94 - 109 mmol/L 110 (H) 110 (H) 111 (H)      Carbon Dioxide 20 - 32 mmol/L 23 21 19 (L)      Anion Gap 3 - 14 mmol/L 8 9 10      Glucose 70 - 99 mg/dL 101 (H) 109 (H) 128 (H)     Comments: Non Fasting      Urea Nitrogen 7 - 30 mg/dL 48 (H) 47 (H) 43 (H)      Creatinine 0.66 - 1.25 mg/dL 3.03 (H) 3.68 (H) 3.68 (H)      GFR Estimate >60 mL/min/1.7m2 20 (L) 16 (L)CM 16 (L)CM     Comments: Non  GFR Calc      GFR Estimate If Black >60 mL/min/1.7m2 24 (L) 19 (L)CM 19 (L)CM     Comments:  GFR Calc      Calcium 8.5 - 10.1 mg/dL 8.9 9.0 8.9      Bilirubin Total 0.2 - 1.3 mg/dL 0.4        Albumin 3.4 - 5.0 g/dL 3.4        Protein Total 6.8 - 8.8 g/dL 7.3        Alkaline Phosphatase 40 - 150 U/L 84        ALT 0 - 70 U/L 18        AST 0 - 45 U/L 19                                                  CBC (Complete Blood Count)        Ref Range & Units 10:13 AM  (5/31/18) 1yr ago  (1/19/17) 1yr ago  (11/2/16)      WBC 4.0 - 11.0 10e9/L 6.1 6.2 6.5      RBC Count 4.4 - 5.9 10e12/L 4.72 4.28 (L) 3.47 (L)      Hemoglobin 13.3 - 17.7 g/dL 14.1 12.5 (L) 10.2 (L)      Hematocrit 40.0 - 53.0 % 44.6 38.5 (L) 32.1 (L)      MCV 78 - 100 fl 95 90 93      MCH 26.5 - 33.0 pg 29.9 29.2 29.4      MCHC 31.5 - 36.5 g/dL 31.6 32.5 31.8      RDW 10.0 - 15.0 % 12.8 14.0 14.0      Platelet Count 150 - 450 10e9/L 179 182 206      Diff Method  Automated Method Automated Method Automated Method      %  Neutrophils % 76.3 68.0 78.8      % Lymphocytes % 8.9 12.8 10.3      % Monocytes % 11.5 17.8 8.4      % Eosinophils % 2.3 1.1 2.0      % Basophils % 0.7 0.3 0.5      % Immature Granulocytes % 0.3        Absolute Neutrophil 1.6 - 8.3 10e9/L 4.7 4.2 5.2      Absolute Lymphocytes 0.8 - 5.3 10e9/L 0.5 (L) 0.8 0.7 (L)      Absolute Monocytes 0.0 - 1.3 10e9/L 0.7 1.1 0.6      Absolute Eosinophils 0.0 - 0.7 10e9/L 0.1 0.1 0.1      Absolute Basophils 0.0 - 0.2 10e9/L 0.0 0.0 0.0      Abs Immature Granulocytes 0 - 0.4 10e9/L 0.0       Resulting Agency  [facility]          Specimen Collected: 05/31/18 10:13 AM Last Resulted: 05/31/18 10:23 AM                                               Resulted Orders   CBC with platelets and differential   Result Value Ref Range    WBC 6.1 4.0 - 11.0 10e9/L    RBC Count 4.72 4.4 - 5.9 10e12/L    Hemoglobin 14.1 13.3 - 17.7 g/dL    Hematocrit 44.6 40.0 - 53.0 %    MCV 95 78 - 100 fl    MCH 29.9 26.5 - 33.0 pg    MCHC 31.6 31.5 - 36.5 g/dL    RDW 12.8 10.0 - 15.0 %    Platelet Count 179 150 - 450 10e9/L    Diff Method Automated Method     % Neutrophils 76.3 %    % Lymphocytes 8.9 %    % Monocytes 11.5 %    % Eosinophils 2.3 %    % Basophils 0.7 %    % Immature Granulocytes 0.3 %    Absolute Neutrophil 4.7 1.6 - 8.3 10e9/L    Absolute Lymphocytes 0.5 (L) 0.8 - 5.3 10e9/L    Absolute Monocytes 0.7 0.0 - 1.3 10e9/L    Absolute Eosinophils 0.1 0.0 - 0.7 10e9/L    Absolute Basophils 0.0 0.0 - 0.2 10e9/L    Abs Immature Granulocytes 0.0 0 - 0.4 10e9/L   Comprehensive metabolic panel (BMP + Alb, Alk Phos, ALT, AST, Total. Bili, TP)   Result Value Ref Range    Sodium 141 133 - 144 mmol/L    Potassium 4.6 3.4 - 5.3 mmol/L    Chloride 110 (H) 94 - 109 mmol/L    Carbon Dioxide 23 20 - 32 mmol/L    Anion Gap 8 3 - 14 mmol/L    Glucose 101 (H) 70 - 99 mg/dL      Comment:      Non Fasting    Urea Nitrogen 48 (H) 7 - 30 mg/dL    Creatinine 3.03 (H) 0.66 - 1.25 mg/dL    GFR Estimate 20 (L) >60 mL/min/1.7m2       Comment:      Non  GFR Calc    GFR Estimate If Black 24 (L) >60 mL/min/1.7m2      Comment:       GFR Calc    Calcium 8.9 8.5 - 10.1 mg/dL    Bilirubin Total 0.4 0.2 - 1.3 mg/dL    Albumin 3.4 3.4 - 5.0 g/dL    Protein Total 7.3 6.8 - 8.8 g/dL    Alkaline Phosphatase 84 40 - 150 U/L    ALT 18 0 - 70 U/L    AST 19 0 - 45 U/L

## 2018-05-31 NOTE — PROGRESS NOTES
SUBJECTIVE:   Cam Gonzalez is a 88 year old male who presents for Preventive Visit.    Are you in the first 12 months of your Medicare Part B coverage?  No    Healthy Habits:    Do you get at least three servings of calcium containing foods daily (dairy, green leafy vegetables, etc.)? yes    Amount of exercise or daily activities, outside of work: 30 minutes activities  hour(s) per day    Problems taking medications regularly No    Medication side effects: No    Have you had an eye exam in the past two years? no    Do you see a dentist twice per year? no    Do you have sleep apnea, excessive snoring or daytime drowsiness?no      Ability to successfully perform activities of daily living: Yes, no assistance needed    Home safety:  none identified     Hearing impairment: No    Fall risk:  Fallen 2 or more times in the past year?: No  Any fall with injury in the past year?: No        COGNITIVE SCREEN  1) Repeat 3 items (Banana, Sunrise, Chair)    2) Clock draw: NORMAL  3) 3 item recall: Recalls 2 objects   Results: NORMAL clock, 1-2 items recalled: COGNITIVE IMPAIRMENT LESS LIKELY    Mini-CogTM Copyright ИВАН Marroquin. Licensed by the author for use in [facility]; reprinted with permission (fito@[facility]). All rights reserved.                Problems taking medications regularly No    Medication side effects: none    Social History   Substance Use Topics     Smoking status: Former Smoker     Quit date: 1/1/1990     Smokeless tobacco: Never Used     Alcohol use No      Comment: rarely has a beer        Reviewed and updated as needed this visit by clinical staff  Tobacco  Allergies  Meds  Soc Hx        Reviewed and updated as needed this visit by Provider            If you drink alcohol do you typically have >3 drinks per day or >7 drinks per week? No                        Today's PHQ-2 Score:   PHQ-2 ( 1999 [employer]) 5/31/2018 5/4/2017   Q1: Little interest or pleasure in doing things 0 0   Q2:  Feeling down, depressed or hopeless 0 0   PHQ-2 Score 0 0       Do you feel safe in your environment - Yes    Do you have a Health Care Directive?: No: Advance care planning was reviewed with patient; patient declined at this time.    Current providers sharing in care for this patient include:   Patient Care Team:  Jacquelin Cueto MD as PCP - General (Family Practice)    The following health maintenance items are reviewed in Epic and correct as of today:  Health Maintenance   Topic Date Due     PNEUMOCOCCAL (1 of 2 - PCV13) 02/05/1995     FALL RISK ASSESSMENT  05/04/2018     INFLUENZA VACCINE (Season Ended) 09/01/2018     ADVANCE DIRECTIVE PLANNING Q5 YRS  07/07/2021     TETANUS IMMUNIZATION (SYSTEM ASSIGNED)  02/22/2026     BP Readings from Last 3 Encounters:   05/31/18 110/76   05/04/17 (!) 89/51   04/11/17 124/83    Wt Readings from Last 3 Encounters:   05/31/18 166 lb 14.4 oz (75.7 kg)   05/04/17 160 lb 1.6 oz (72.6 kg)   04/11/17 159 lb 8 oz (72.3 kg)                  Patient Active Problem List   Diagnosis     Renal failure     Syncope, unspecified syncope type     Acute renal failure, unspecified acute renal failure type (H)     Bilateral hydronephrosis     Asymptomatic cholelithiasis     Enlarged prostate     Mild concentric left ventricular hypertrophy (LVH)     Anemia of chronic disease     Vitamin D deficiency     Urinary retention     Elevated blood pressure reading without diagnosis of hypertension     Carotid stenosis, asymptomatic, bilateral     Sinus bradycardia     HTN (hypertension)     Second degree AV block, Mobitz type II     S/P placement of cardiac pacemaker     Syncope and collapse     CKD (chronic kidney disease) stage 4, GFR 15-29 ml/min (H)     Obstructive uropathy     ACP (advance care planning)     Hill catheter in place     S/P TURP (status post transurethral resection of prostate)     No past surgical history on file.    Social History   Substance Use Topics     Smoking status:  "Former Smoker     Quit date: 1/1/1990     Smokeless tobacco: Never Used     Alcohol use No      Comment: rarely has a beer     Family History   Problem Relation Age of Onset     Prostate Cancer Father          Current Outpatient Prescriptions   Medication Sig Dispense Refill     Cholecalciferol (VITAMIN D3 PO) Take 2,000 Units by mouth daily       ferrous sulfate (IRON) 325 (65 Fe) MG tablet TAKE ONE TABLET BY MOUTH EVERY OTHER DAY 30 tablet 0     Probiotic Product (PROBIOTIC DAILY PO)        No Known Allergies        ROS:  CONSTITUTIONAL:NEGATIVE for fever, chills, change in weight  INTEGUMENTARY/SKIN: NEGATIVE for worrisome rashes, moles or lesions  EYES: NEGATIVE for vision changes or irritation  ENT/MOUTH: NEGATIVE for ear, mouth and throat problems  RESP:NEGATIVE for significant cough or SOB  CV: NEGATIVE for chest pain, palpitations or peripheral edema. Pacemaker checked in April of 2018  GI: NEGATIVE for nausea, abdominal pain, heartburn, or change in bowel habits  : negative for dysuria, hematuria,- self cath for urinary retention and seeing his urologist in late June  MUSCULOSKELETAL: NEGATIVE for significant arthralgias or myalgia  NEURO: NEGATIVE for weakness, dizziness or paresthesias    OBJECTIVE:   /76 (BP Location: Right arm, Patient Position: Sitting, Cuff Size: Adult Large)  Pulse 69  Temp 96.9  F (36.1  C) (Temporal)  Ht 5' 8.9\" (1.75 m)  Wt 166 lb 14.4 oz (75.7 kg)  SpO2 97%  BMI 24.72 kg/m2 Estimated body mass index is 24.72 kg/(m^2) as calculated from the following:    Height as of this encounter: 5' 8.9\" (1.75 m).    Weight as of this encounter: 166 lb 14.4 oz (75.7 kg).  EXAM:   GENERAL: healthy, alert and no distress  EYES: Eyes grossly normal to inspection, PERRL and conjunctivae and sclerae normal  HENT: ear canals and TM's normal, nose and mouth without ulcers or lesions  NECK: no adenopathy, no asymmetry, masses, or scars and thyroid normal to palpation  RESP: lungs clear to " auscultation - no rales, rhonchi or wheezes  CV: regular rate and rhythm, normal S1 S2, no S3 or S4, no murmur, click or rub, no peripheral edema and peripheral pulses strong  ABDOMEN: soft, nontender, no hepatosplenomegaly, no masses and bowel sounds normal  MS: no gross musculoskeletal defects noted, no edema  SKIN: no suspicious lesions or rashes  NEURO: Normal strength and tone, mentation intact and speech normal  PSYCH: mentation appears normal, affect normal/bright    ASSESSMENT / PLAN:       ICD-10-CM    1. Routine general medical examination at a health care facility Z00.00 CBC with platelets and differential     Comprehensive metabolic panel (BMP + Alb, Alk Phos, ALT, AST, Total. Bili, TP)     Pt has MyChart and will review lab tests when they become available.

## 2018-05-31 NOTE — MR AVS SNAPSHOT
After Visit Summary   5/31/2018    Cam Gonzalez    MRN: [id number]           Patient Information     Date Of Birth          2/5/1930        Visit Information        Provider Department      5/31/2018 9:10 AM Doug Call MD [facility] [[email or web address]]        Today's Diagnoses     Routine general medical examination at a health care facility    -  1      Care Instructions      Preventive Health Recommendations:       Male Ages 65 and over    Yearly exam:             See your health care provider every year in order to  o   Review health changes.   o   Discuss preventive care.    o   Review your medicines if your doctor has prescribed any.    Talk with your health care provider about whether you should have a test to screen for prostate cancer (PSA).    Every 3 years, have a diabetes test (fasting glucose). If you are at risk for diabetes, you should have this test more often.    Every 5 years, have a cholesterol test. Have this test more often if you are at risk for high cholesterol or heart disease.     Every 10 years, have a colonoscopy. Or, have a yearly FIT test (stool test). These exams will check for colon cancer.    Talk to with your health care provider about screening for Abdominal Aortic Aneurysm if you have a family history of AAA or have a history of smoking.  Shots:     Get a flu shot each year.     Get a tetanus shot every 10 years.     Talk to your doctor about your pneumonia vaccines. There are now two you should receive - Pneumovax (PPSV 23) and Prevnar (PCV 13).    Talk to your doctor about a shingles vaccine.     Talk to your doctor about the hepatitis B vaccine.  Nutrition:     Eat at least 5 servings of fruits and vegetables each day.     Eat whole-grain bread, whole-wheat pasta and brown rice instead of white grains and rice.     Talk to your doctor about Calcium and Vitamin D.   Lifestyle    Exercise for at least 150 minutes a week (30 minutes a day, 5 days a  week). This will help you control your weight and prevent disease.     Limit alcohol to one drink per day.     No smoking.     Wear sunscreen to prevent skin cancer.     See your dentist every six months for an exam and cleaning.     See your eye doctor every 1 to 2 years to screen for conditions such as glaucoma, macular degeneration and cataracts.          Follow-ups after your visit        Who to contact     If you have questions or need follow up information about today's clinic visit or your schedule please contact [facility] directly at [phone].  Normal or non-critical lab and imaging results will be communicated to you by Floodlighthart, letter or phone within 4 business days after the clinic has received the results. If you do not hear from us within 7 days, please contact the clinic through People Pow[employer] or phone. If you have a critical or abnormal lab result, we will notify you by phone as soon as possible.  Submit refill requests through GetNinjas or call your pharmacy and they will forward the refill request to us. Please allow 3 business days for your refill to be completed.          Additional Information About Your Visit        GetNinjas Information     Get[employer]injas is an electronic gateway that provides easy, online access to your medical records. With GetNinjas, you can request a clinic appointment, read your test results, renew a prescription or communicate with your care team.     To sign up for Get[employer]injas visit the website at [email or web address]   You will be asked to enter the access code listed below, as well as some personal information. Please follow the directions to create your username and password.     Your access code is: [email or web address]  Expires: 2018 10:13 AM     Your access code will  in 90 days. If you need help or a new code, please contact your [facility] or call [phone] for assistance.        Care EveryWhere ID     This is your Care  "EveryWhere ID. This could be used by other organizations to access your [facility] medical records  [id number]        Your Vitals Were     Pulse Temperature Height Pulse Oximetry BMI (Body Mass Index)       69 96.9  F (36.1  C) (Temporal) 5' 8.9\" (1.75 m) 97% 24.72 kg/m2        Blood Pressure from Last 3 Encounters:   05/31/18 110/76   05/04/17 (!) 89/51   04/11/17 124/83    Weight from Last 3 Encounters:   05/31/18 166 lb 14.4 oz (75.7 kg)   05/04/17 160 lb 1.6 oz (72.6 kg)   04/11/17 159 lb 8 oz (72.3 kg)              We Performed the Following     CBC with platelets and differential     Comprehensive metabolic panel (BMP + Alb, Alk Phos, ALT, AST, Total. Bili, TP)        Primary Care Provider Office Phone # Fax #    Jacquelin Cueto [phone] [address] [facility] [address]        Equal Access to Services     [facility]: Hadii aad ku hadasho Soomaali, waaxda luqadaha, qaybta kaalmada adeegyasolitario, wiley stone . So [facility] [phone].    ATENCIÓN: Si habla español, tiene a martino disposición servicios gratuitos de asistencia lingüística. Llame al [phone].    We comply with applicable federal civil rights laws and [place] laws. We do not discriminate on the basis of race, color, national origin, age, disability, sex, sexual orientation, or gender identity.            Thank you!     Thank you for choosing [facility]  for your care. Our goal is always to provide you with excellent care. Hearing back from our patients is one way we can continue to improve our services. Please take a few minutes to complete the written survey that you may receive in the mail after your visit with us. Thank you!             Your Updated Medication List - Protect others around you: Learn how to safely use, store and throw away your medicines at www.disposemymeds.org.          This list is accurate as of 5/31/18 10:13 AM.  Always use your most recent med " list.                   Brand Name Dispense Instructions for use Diagnosis    ferrous sulfate 325 (65 Fe) MG tablet    IRON    30 tablet    TAKE ONE TABLET BY MOUTH EVERY OTHER DAY    Anemia of chronic disease       PROBIOTIC DAILY PO           VITAMIN D3 PO      Take 2,000 Units by mouth daily

## 2018-06-28 ENCOUNTER — TRANSFERRED RECORDS (OUTPATIENT)
Dept: HEALTH INFORMATION MANAGEMENT | Facility: CLINIC | Age: 83
End: 2018-06-28

## 2018-08-08 DIAGNOSIS — D63.8 ANEMIA OF CHRONIC DISEASE: ICD-10-CM

## 2018-08-09 RX ORDER — FERROUS SULFATE 325(65) MG
TABLET ORAL
Qty: 30 TABLET | Refills: 3 | Status: SHIPPED | OUTPATIENT
Start: 2018-08-09 | End: 2019-06-17

## 2018-08-09 NOTE — TELEPHONE ENCOUNTER
ferrous sulfate (IRON) 325 (65 Fe) MG tablet 30 tablet 0 5/17/2018  No   Sig: TAKE ONE TABLET BY MOUTH EVERY OTHER DAY     Last OV with Dr. Cueto: 11/2/2016 and physical with Dr. Call on 5/31/2018    No future apts.     Hemoglobin   Date Value Ref Range Status   05/31/2018 14.1 13.3 - 17.7 g/dL Final     Iron Supplements Passed8/8 10:25 AM   Patient is 12 years of age or older    Recent (12 mo) or future (30 days) visit within the authorizing provider's specialty    Hgb OR Hct on record within the past 12 mos.     Refilled per [facility] protocol.    Chayito Barron RN

## 2018-12-13 ENCOUNTER — TRANSFERRED RECORDS (OUTPATIENT)
Dept: HEALTH INFORMATION MANAGEMENT | Facility: CLINIC | Age: 83
End: 2018-12-13

## 2019-01-01 ENCOUNTER — RECORDS - HEALTHEAST (OUTPATIENT)
Dept: LAB | Facility: CLINIC | Age: 84
End: 2019-01-01

## 2019-01-01 LAB
ANION GAP SERPL CALCULATED.3IONS-SCNC: 9 MMOL/L (ref 5–18)
BUN SERPL-MCNC: 29 MG/DL (ref 8–28)
CALCIUM SERPL-MCNC: 8.6 MG/DL (ref 8.5–10.5)
CHLORIDE BLD-SCNC: 108 MMOL/L (ref 98–107)
CO2 SERPL-SCNC: 19 MMOL/L (ref 22–31)
CREAT SERPL-MCNC: 3.31 MG/DL (ref 0.7–1.3)
GFR SERPL CREATININE-BSD FRML MDRD: 18 ML/MIN/1.73M2
GLUCOSE BLD-MCNC: 75 MG/DL (ref 70–125)
HGB BLD-MCNC: 6.7 G/DL (ref 14–18)
POTASSIUM BLD-SCNC: 4.3 MMOL/L (ref 3.5–5)
SODIUM SERPL-SCNC: 136 MMOL/L (ref 136–145)

## 2019-04-16 ENCOUNTER — TRANSFERRED RECORDS (OUTPATIENT)
Dept: HEALTH INFORMATION MANAGEMENT | Facility: CLINIC | Age: 84
End: 2019-04-16

## 2019-04-24 ENCOUNTER — TELEPHONE (OUTPATIENT)
Dept: PEDIATRICS | Facility: CLINIC | Age: 84
End: 2019-04-24

## 2019-04-24 DIAGNOSIS — R33.9 URINARY RETENTION: Primary | ICD-10-CM

## 2019-04-24 NOTE — TELEPHONE ENCOUNTER
[facility] Call Center    Phone Message    May a detailed message be left on voicemail: yes    Reason for Call: Other: Patients daughter is calling on behalf of patient requesting to see if Dr. Cueto will prescribe cath supplies for him. Patient is scheduled for appointment with Dr. Cueto on 04/26/19; however daughter states that they can't wait that long for orders.  Patient is requesting new catheters since he is self cath. Requesting to get cath supples from [facility].     [facility] attention to [place]: [phone]       Action Taken: Message routed to:  Primary Care p [id number]

## 2019-04-24 NOTE — TELEPHONE ENCOUNTER
Prescription done, please fax prescription and inform patient  RN staff, please call patient for care coordination following hospital discharge

## 2019-04-24 NOTE — TELEPHONE ENCOUNTER
[facility] Call Center    Phone Message    May a detailed message be left on voicemail: yes    Reason for Call: Ligia from [facility] home care wanted to notify that pt has declined home care. He states he can do it on his own. Ligia states he is doing pretty well. Please call her with any questions.    Action Taken: Message routed to:  Primary Care p [phone]

## 2019-04-24 NOTE — TELEPHONE ENCOUNTER
Rx faxed to [facility] per request, message left for daughter that it has been sent.  Contact number in chart is for daughter, Meagan Gil.  She did not answer.  Will attempt again tomorrow to do hospital follow up care coordination outreach.  Maryan Iqbal RN

## 2019-04-24 NOTE — TELEPHONE ENCOUNTER
Requesting cath supplies. Patient last saw Dr. Cueto in 11/2016. Saw Dr. Call last May. Scheduled here 4/26.     Was hospitalized for pneumonia & UTI on 4/16 at [facility].  Patient denies home care in other encounter.  Jasmin Rivero RN

## 2019-04-25 ENCOUNTER — TELEPHONE (OUTPATIENT)
Dept: PEDIATRICS | Facility: CLINIC | Age: 84
End: 2019-04-25

## 2019-04-25 ENCOUNTER — PATIENT OUTREACH (OUTPATIENT)
Dept: PEDIATRICS | Facility: CLINIC | Age: 84
End: 2019-04-25

## 2019-04-25 NOTE — TELEPHONE ENCOUNTER
[facility] Call Center    Phone Message    May a detailed message be left on voicemail: no    Reason for Call: Order(s): Other:   Reason for requested: Cath supplies  Date needed: Today.  Script sent to [employer] on 4.24.19.  Not received.  Asking for another fax.   Provider name: Rom.  Fax: [phone]      Action Taken: Message routed to:  Primary Care p [phone]

## 2019-04-25 NOTE — PROGRESS NOTES
[facility]:  Care Coordination Note     SITUATION   Cam Gonzalez is a 89 year old male who is receiving support for:  Clinic Care Coordination - Post Hospital (F/U hospitalization)  .    BACKGROUND     Patient lives in a private home with daughter and granddaughter.  Per chart review, patient was noted to have unusual weakness and was brought into [facility] per EMS for evaluation.    ASSESSMENT     Patient was hospitalized at [facility] from 4/16/2019 to 4/21/2019 with diagnosis of sepsis secondary to UTI, community acquired pneumonia, A-fib with RVR, peripheral vascular disease.  Hx of CKD BPH, urinary retention and hypertension.    PLAN          Nursing Interventions:  Received notification that patient had been hospitalized via telephone encounter dated 4/24/19 (See encounter for details). Chart reviewed.  Unable to reach daughter for follow up on 4/24/19.  Was able to connect with daughter today.  States that patient is feeling better each day, and feels that he is going in the right direction.  Patient had needed new self-catheter supplies, updated daughter that order had been faxed and gave contact information if there are any issues or concerns regarding order.  Patient had been given a 2 day supply of antibiotic on discharge, was able to obtain the medication and finish course.     Follow-up plan:  Patient has follow up appointment scheduled with Dr. Cueto 4/26/19, daughter will bring patient to appointment.         Maryan Iqbal RN   RN Care Coordinator, Primary Care

## 2019-04-25 NOTE — TELEPHONE ENCOUNTER
Daughter informed that order has been faxed, will contact the clinic if there are any further needs regarding order.  Maryan Iqbal RN

## 2019-04-26 ENCOUNTER — OFFICE VISIT (OUTPATIENT)
Dept: PEDIATRICS | Facility: CLINIC | Age: 84
End: 2019-04-26
Payer: MEDICARE

## 2019-04-26 ENCOUNTER — TELEPHONE (OUTPATIENT)
Dept: PEDIATRICS | Facility: CLINIC | Age: 84
End: 2019-04-26

## 2019-04-26 VITALS
BODY MASS INDEX: 22.11 KG/M2 | TEMPERATURE: 98.1 F | HEIGHT: 69 IN | OXYGEN SATURATION: 97 % | SYSTOLIC BLOOD PRESSURE: 121 MMHG | HEART RATE: 55 BPM | WEIGHT: 149.3 LBS | DIASTOLIC BLOOD PRESSURE: 70 MMHG

## 2019-04-26 DIAGNOSIS — M62.81 GENERALIZED MUSCLE WEAKNESS: ICD-10-CM

## 2019-04-26 DIAGNOSIS — J18.9 PNEUMONIA OF RIGHT LOWER LOBE DUE TO INFECTIOUS ORGANISM: ICD-10-CM

## 2019-04-26 DIAGNOSIS — N39.0 URINARY TRACT INFECTION WITHOUT HEMATURIA, SITE UNSPECIFIED: ICD-10-CM

## 2019-04-26 DIAGNOSIS — N18.4 CKD (CHRONIC KIDNEY DISEASE) STAGE 4, GFR 15-29 ML/MIN (H): ICD-10-CM

## 2019-04-26 DIAGNOSIS — R33.9 URINARY RETENTION WITH INCOMPLETE BLADDER EMPTYING: ICD-10-CM

## 2019-04-26 DIAGNOSIS — L60.3 NAIL DYSTROPHY: ICD-10-CM

## 2019-04-26 DIAGNOSIS — Z09 HOSPITAL DISCHARGE FOLLOW-UP: Primary | ICD-10-CM

## 2019-04-26 DIAGNOSIS — D64.9 LOW HEMOGLOBIN: ICD-10-CM

## 2019-04-26 DIAGNOSIS — I73.9 PAD (PERIPHERAL ARTERY DISEASE) (H): ICD-10-CM

## 2019-04-26 LAB
ANION GAP SERPL CALCULATED.3IONS-SCNC: 9 MMOL/L (ref 3–14)
BASOPHILS # BLD AUTO: 0.1 10E9/L (ref 0–0.2)
BASOPHILS NFR BLD AUTO: 0.5 %
BUN SERPL-MCNC: 44 MG/DL (ref 7–30)
CALCIUM SERPL-MCNC: 9.1 MG/DL (ref 8.5–10.1)
CHLORIDE SERPL-SCNC: 106 MMOL/L (ref 94–109)
CO2 SERPL-SCNC: 22 MMOL/L (ref 20–32)
CREAT SERPL-MCNC: 2.7 MG/DL (ref 0.66–1.25)
DIFFERENTIAL METHOD BLD: ABNORMAL
EOSINOPHIL # BLD AUTO: 0.1 10E9/L (ref 0–0.7)
EOSINOPHIL NFR BLD AUTO: 1 %
ERYTHROCYTE [DISTWIDTH] IN BLOOD BY AUTOMATED COUNT: 14.4 % (ref 10–15)
GFR SERPL CREATININE-BSD FRML MDRD: 20 ML/MIN/{1.73_M2}
GLUCOSE SERPL-MCNC: 114 MG/DL (ref 70–99)
HCT VFR BLD AUTO: 39.2 % (ref 40–53)
HGB BLD-MCNC: 12.1 G/DL (ref 13.3–17.7)
IMM GRANULOCYTES # BLD: 0.1 10E9/L (ref 0–0.4)
IMM GRANULOCYTES NFR BLD: 0.9 %
LYMPHOCYTES # BLD AUTO: 0.5 10E9/L (ref 0.8–5.3)
LYMPHOCYTES NFR BLD AUTO: 5.4 %
MAGNESIUM SERPL-MCNC: 1.7 MG/DL (ref 1.6–2.3)
MCH RBC QN AUTO: 28.9 PG (ref 26.5–33)
MCHC RBC AUTO-ENTMCNC: 30.9 G/DL (ref 31.5–36.5)
MCV RBC AUTO: 94 FL (ref 78–100)
MONOCYTES # BLD AUTO: 1.1 10E9/L (ref 0–1.3)
MONOCYTES NFR BLD AUTO: 11.1 %
NEUTROPHILS # BLD AUTO: 7.9 10E9/L (ref 1.6–8.3)
NEUTROPHILS NFR BLD AUTO: 81.1 %
PLATELET # BLD AUTO: 290 10E9/L (ref 150–450)
POTASSIUM SERPL-SCNC: 3.8 MMOL/L (ref 3.4–5.3)
RBC # BLD AUTO: 4.19 10E12/L (ref 4.4–5.9)
SODIUM SERPL-SCNC: 137 MMOL/L (ref 133–144)
WBC # BLD AUTO: 9.8 10E9/L (ref 4–11)

## 2019-04-26 PROCEDURE — 83735 ASSAY OF MAGNESIUM: CPT | Performed by: FAMILY MEDICINE

## 2019-04-26 PROCEDURE — 80048 BASIC METABOLIC PNL TOTAL CA: CPT | Performed by: FAMILY MEDICINE

## 2019-04-26 PROCEDURE — 85025 COMPLETE CBC W/AUTO DIFF WBC: CPT | Performed by: FAMILY MEDICINE

## 2019-04-26 PROCEDURE — 36415 COLL VENOUS BLD VENIPUNCTURE: CPT | Performed by: FAMILY MEDICINE

## 2019-04-26 PROCEDURE — 99496 TRANSJ CARE MGMT HIGH F2F 7D: CPT | Performed by: FAMILY MEDICINE

## 2019-04-26 ASSESSMENT — MIFFLIN-ST. JEOR: SCORE: 1332.6

## 2019-04-26 NOTE — PATIENT INSTRUCTIONS
Get the labs today  Schedule for chest xray in 6 weeks  Schedule for urology consult for urinary retention

## 2019-04-26 NOTE — RESULT ENCOUNTER NOTE
Please inform patient of  test results from today- mproved hemoglobin, decreased to kidney functions as his baseline, normal magnesium

## 2019-04-26 NOTE — TELEPHONE ENCOUNTER
[id number] spoke with Meagan Gil, informed that [facility] has been contacted.      Form received from [facility], to provider for completion at office visit.  Maryan Iqbal RN

## 2019-04-26 NOTE — PROGRESS NOTES
SUBJECTIVE:   Cam Gonzalez is a 89 year old male who presents to clinic today for the following   health issues:    Hospital Follow-up Visit:  Patient who was last seen in November 2016 is here with his daughter for hospital discharge follow-up after having a very tract infection and right lung pneumonia a week ago.  Patient states he was walking well all day on April 16 when he suddenly became weak and his wife to help him down to his chair with no history of falls or syncope.  Patient self caths at home and has a history of UTIs in the past, concerned that was happening at that time.  He went to the [facility] ER, patient received 2 L normal saline which cleared his elevated lactic acid.  He also was found to be in atrial fibrillation that spontaneously converted to normal sinus rhythm after IV hydration.  During his exam in the emergency department, patient was noted not to have dopplerable peripheral pulses which was new for him.  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    Patient was initially started on heparin drip per vascular surgeon Dr. Phoenix.  Patient and family refused to consult vascular surgeon due to not wishing to proceed with surgery.  He was seeing Dr. Mcbride at the [facility] urology, is wishing to transfer care to urologist here  Patient was also diagnosed with right lung pneumonia and UTI both were treated  He denies current concerns for dysuria, hematuria, urinary urgency or frequency, cough, shortness of breath, hemoptysis, fever, chills, fatigue.  His weakness is improved significantly  Denies nausea, vomiting, has been eating well since he came home  Daughter is also interested in seeing a podiatrist for trimming his nails  Hospital/Nursing Home/IP Rehab Facility: [facility]  Date of Admission: 4/16/19  Date of Discharge: 4/21/19   Reason(s) for Admission: Bladder infection and Penumonia             Problems taking medications regularly:  None       Medication changes since discharge: Given antibiotics but done with them now       Problems adhering to non-medication therapy:  None    Summary of hospitalization:  CareEverywhere information obtained and reviewed  Diagnostic Tests/Treatments reviewed.  Follow up needed: Chest x-ray and labs  Other Healthcare Providers Involved in Patient s Care:         None  Update since discharge: fluctuating course.     Post Discharge Medication Reconciliation: discharge medications reconciled, continue medications without change.  Plan of care communicated with patient and daughter     Coding guidelines for this visit:  Type of Medical   Decision Making Face-to-Face Visit       within 7 Days of discharge Face-to-Face Visit        within 14 days of discharge   Moderate Complexity 64701 [id number]   High Complexity 31909 [id number]                  Additional history: as documented    Reviewed  and updated as needed this visit by clinical staff         Reviewed and updated as needed this visit by Provider         Patient Active Problem List   Diagnosis     Renal failure     Syncope, unspecified syncope type     Acute renal failure, unspecified acute renal failure type (H)     Bilateral hydronephrosis     Asymptomatic cholelithiasis     Enlarged prostate     Mild concentric left ventricular hypertrophy (LVH)     Anemia of chronic disease     Vitamin D deficiency     Urinary retention     Elevated blood pressure reading without diagnosis of hypertension     Carotid stenosis, asymptomatic, bilateral     Sinus bradycardia     HTN (hypertension)     Second degree AV block, Mobitz type II     S/P placement of cardiac pacemaker     Syncope and collapse     CKD (chronic kidney disease) stage 4, GFR 15-29 ml/min (H)     Obstructive uropathy     ACP (advance care planning)     Hill catheter in place     S/P TURP (status post transurethral resection of prostate)     PAD (peripheral artery disease)  (H)     Nail dystrophy     Urinary retention with incomplete bladder emptying     No past surgical history on file.    Social History     Tobacco Use     Smoking status: Former Smoker     Last attempt to quit: 1990     Years since quittin.3     Smokeless tobacco: Never Used   Substance Use Topics     Alcohol use: No     Alcohol/week: 0.0 oz     Comment: rarely has a beer     Family History   Problem Relation Age of Onset     Prostate Cancer Father          Current Outpatient Medications   Medication Sig Dispense Refill     Cholecalciferol (VITAMIN D3 PO) Take 2,000 Units by mouth daily       ferrous sulfate (IRON) 325 (65 Fe) MG tablet TAKE 1 TABLET BY MOUTH EVERY OTHER DAY 30 tablet 3     order for DME Equipment being ordered: self catheter supplies 30 Units 0     Probiotic Product (PROBIOTIC DAILY PO)        No Known Allergies  Recent Labs   Lab Test 19  1518 18  1013  16  1450  16  0606 16  0050 16  1450   ALT  --  18  --  41  --   --   --  16   CR 2.70* 3.03*   < > 3.35*   < > 4.93* 4.99* 4.95*   GFRESTIMATED 20* 20*   < > 18*   < > 11* 11* 11*   GFRESTBLACK 23* 24*   < > 21*   < > 14* 13* 14*   POTASSIUM 3.8 4.6   < > 3.8   < > 4.8  --  4.6   TSH  --   --   --   --   --  Canceled, Test credited   Duplicate request   Called to  [facility], SEE [id number] FOR RESULT   2.30  --     < > = values in this interval not displayed.      BP Readings from Last 3 Encounters:   19 121/70   18 110/76   17 (!) 89/51    Wt Readings from Last 3 Encounters:   19 67.7 kg (149 lb 4.8 oz)   18 75.7 kg (166 lb 14.4 oz)   17 72.6 kg (160 lb 1.6 oz)                  Labs reviewed in EPIC    ROS:  CONSTITUTIONAL: NEGATIVE for fever, chills, change in weight  INTEGUMENTARY/SKIN: as above  RESP:as above  CV: NEGATIVE for chest pain, palpitations or peripheral edema  GI: NEGATIVE for nausea, abdominal pain, heartburn, or change in bowel habits  : as  "above  MUSCULOSKELETAL: NEGATIVE for significant arthralgias or myalgia  NEURO: NEGATIVE for weakness, dizziness or paresthesias  ENDOCRINE: NEGATIVE for temperature intolerance, skin/hair changes  HEME/ALLERGY/IMMUNE: NEGATIVE for bleeding problems  PSYCHIATRIC: NEGATIVE for changes in mood or affect    OBJECTIVE:     /70 (BP Location: Right arm, Patient Position: Sitting, Cuff Size: Adult Regular)   Pulse 55   Temp 98.1  F (36.7  C) (Tympanic)   Ht 1.753 m (5' 9\")   Wt 67.7 kg (149 lb 4.8 oz)   SpO2 97%   BMI 22.05 kg/m    Body mass index is 22.05 kg/m .  GENERAL: healthy, alert and no distress  HENT: ear canals and TM's normal, nose and mouth without ulcers or lesions  NECK: no adenopathy, no asymmetry, masses, or scars and thyroid normal to palpation  RESP: lungs clear to auscultation - no rales, rhonchi or wheezes  CV: regular rate and rhythm, normal S1 S2, no S3 or S4, no murmur, click or rub, no peripheral edema and peripheral pulses strong  ABDOMEN: soft, nontender, no hepatosplenomegaly, no masses and bowel sounds normal  No renal or costovertebral angle tenderness  MS: no gross musculoskeletal defects noted, no edema  SKIN: no suspicious lesions or rashes  PSYCH: mentation appears normal, affect normal/bright    Diagnostic Test Results:  Results for orders placed or performed in visit on 04/26/19 (from the past 24 hour(s))   CBC with platelets and differential   Result Value Ref Range    WBC 9.8 4.0 - 11.0 10e9/L    RBC Count 4.19 (L) 4.4 - 5.9 10e12/L    Hemoglobin 12.1 (L) 13.3 - 17.7 g/dL    Hematocrit 39.2 (L) 40.0 - 53.0 %    MCV 94 78 - 100 fl    MCH 28.9 26.5 - 33.0 pg    MCHC 30.9 (L) 31.5 - 36.5 g/dL    RDW 14.4 10.0 - 15.0 %    Platelet Count 290 150 - 450 10e9/L    Diff Method Automated Method     % Neutrophils 81.1 %    % Lymphocytes 5.4 %    % Monocytes 11.1 %    % Eosinophils 1.0 %    % Basophils 0.5 %    % Immature Granulocytes 0.9 %    Absolute Neutrophil 7.9 1.6 - 8.3 10e9/L    " Absolute Lymphocytes 0.5 (L) 0.8 - 5.3 10e9/L    Absolute Monocytes 1.1 0.0 - 1.3 10e9/L    Absolute Eosinophils 0.1 0.0 - 0.7 10e9/L    Absolute Basophils 0.1 0.0 - 0.2 10e9/L    Abs Immature Granulocytes 0.1 0 - 0.4 10e9/L   Basic metabolic panel  (Ca, Cl, CO2, Creat, Gluc, K, Na, BUN)   Result Value Ref Range    Sodium 137 133 - 144 mmol/L    Potassium 3.8 3.4 - 5.3 mmol/L    Chloride 106 94 - 109 mmol/L    Carbon Dioxide 22 20 - 32 mmol/L    Anion Gap 9 3 - 14 mmol/L    Glucose 114 (H) 70 - 99 mg/dL    Urea Nitrogen 44 (H) 7 - 30 mg/dL    Creatinine 2.70 (H) 0.66 - 1.25 mg/dL    GFR Estimate 20 (L) >60 mL/min/[1.73_m2]    GFR Estimate If Black 23 (L) >60 mL/min/[1.73_m2]    Calcium 9.1 8.5 - 10.1 mg/dL   Magnesium   Result Value Ref Range    Magnesium 1.7 1.6 - 2.3 mg/dL       ASSESSMENT/PLAN:             1. Hospital discharge follow-up  Reviewed documents and reports from care everywhere  Reviewed lab results showing hemoglobin of 10.1, low magnesium and significantly declined kidney functions  Lab results were repeated today showing improving hemoglobin, normal magnesium and slightly improved kidney functions are consistent with chronic kidney disease stage IV that is unchanged from baseline  - CBC with platelets and differential  - Basic metabolic panel  (Ca, Cl, CO2, Creat, Gluc, K, Na, BUN)  - Magnesium  - XR Chest 2 Views; Future    2. Generalized muscle weakness  Likely from the sepsis and pneumonia  Resolved  - CBC with platelets and differential  - Basic metabolic panel  (Ca, Cl, CO2, Creat, Gluc, K, Na, BUN)  - Magnesium    3. Low hemoglobin  Lab Results   Component Value Date    WBC 9.8 04/26/2019     Lab Results   Component Value Date    RBC 4.19 04/26/2019     Lab Results   Component Value Date    HGB 12.1 04/26/2019     Lab Results   Component Value Date    HCT 39.2 04/26/2019     No components found for: MCT  Lab Results   Component Value Date    MCV 94 04/26/2019     Lab Results   Component Value  Date    MCH 28.9 04/26/2019     Lab Results   Component Value Date    MCHC 30.9 04/26/2019     Lab Results   Component Value Date    RDW 14.4 04/26/2019     Lab Results   Component Value Date     04/26/2019     as above    - CBC with platelets and differential    4. Urinary retention with incomplete bladder emptying  Per patient's request, referral made to start seeing urologist here  - UROLOGY ADULT REFERRAL    5. Urinary tract infection without hematuria, site unspecified  Resolved, monitor    6. Pneumonia of right lower lobe due to infectious organism (H)  Patient is asymptomatic and doing well  Recommended to have the repeat chest x-ray to confirm resolution in 5 to 6 weeks  XRAY CHEST PORTABLE4/16/2019  [facility]   Result Impression   IMPRESSION:    Suspect left infrahilar infiltrate. Recommend follow-up upright PA and lateral chest radiographs, 8 weeks after completion of therapy, to confirm resolution and return to baseline radiographic appearance.      REPORT SIGNED BY DR. Maximo Chinchilla   Result Narrative       - XR Chest 2 Views; Future    7. Nail dystrophy  Recommended to consult podiatry for further evaluation  - PODIATRY/FOOT & ANKLE SURGERY REFERRAL    8. PAD (peripheral artery disease) (H)  Reviewed the significantly abnormal arterial ultrasound with patient's daughter who stated patient declined anticoagulation and surgery at this point  And given his underlying chronic kidney disease stage IV state , they do not want him to get any kind of contrast  Recommended patient to consult vascular surgeon to have alternate options for management given the severity of PAD  Referral made  Patient verbalised understanding and is agreeable to the plan.    US ART EXTREMITY LOW BILAT NO ABI4/16/2019  [facility]   Result Impression   IMPRESSION:  1.  Severe stenosis in the in the right proximal femoral artery with velocities of 456 cm/s. Monophasic waveforms and low velocities beyond  the stenosis.  2.  On the left side, monophasic waveforms and lower velocities throughout the left lower extremity. Suspect a more proximal stenosis.  3.  Absent flow in the dorsalis pedis arteries bilaterally and in the left posterior tibial artery. Ankle-brachial indices could not be calcified due to calcified vessels.       - VASCULAR SURGERY REFERRAL; Future    Chart documentation done in part with Dragon Voice recognition Software. Although reviewed after completion, some word and grammatical error may remain.    See Patient Instructions    Jacquelin Cueto MD  [facility]

## 2019-04-26 NOTE — TELEPHONE ENCOUNTER
"[phone] voice mail from daughter, Meagan Gil, requesting to place call to [employer] on their doctor hotline at [phone] and give verbal order for catheter supplies.  She is having trouble getting catheters for her father.  Pt would like to try some some samples of products that are prelubricated and also would like some plain catheters with the lubricant in the package.  The current supply of catheters that the patient has are \"scratchy\" and sometimes he bleeds a little.  He is looking to find some that are more comfortable to use.      1240 contacted [employer], they had faxed a form to the office that needs to be completed.  Asked that form be faxed again to [phone].  Their records show that they do have some samples going out to patient.  Will look for faxed form to be completed.    "

## 2019-04-26 NOTE — TELEPHONE ENCOUNTER
Attempt #1:  Left message for patient on home phone number to return call to clinic.  Maria Del Carmen CORDERO CMA

## 2019-04-26 NOTE — Clinical Note
Please call patient's daughter with vascular surgery consult at the earliest due to severe PADThank you.

## 2019-04-26 NOTE — TELEPHONE ENCOUNTER
Result Notes for Basic metabolic panel  (Ca, Cl, CO2, Creat, Gluc, K, Na, BUN)     Notes recorded by Jacquelin Cueto MD on 4/26/2019 at 4:32 PM CDT  Please inform patient of  test results from today- mproved hemoglobin, decreased to kidney functions as his baseline, normal magnesium

## 2019-04-29 ENCOUNTER — TELEPHONE (OUTPATIENT)
Dept: OTHER | Facility: CLINIC | Age: 84
End: 2019-04-29

## 2019-04-29 NOTE — PROGRESS NOTES
Vascular Consult scheduled at [facility] (Meagan Gil's preferred location) for May 8 with Dr Menard.  MEGHAN at 1:00 pm with provider after.    Message left with Meagan Gil. Letter to be sent by their schedulers.    Shakila Pack  Primary Care   [facility] [place]

## 2019-04-29 NOTE — TELEPHONE ENCOUNTER
"Pt referred to [facility] by Dr. Cueto for severe PAD.  Per clinic, pt requesting [facility] location.    Per Care Everywhere bilateral LE arterial US on 4/16/19 at [facility]:  \"IMPRESSION:  1.  Severe stenosis in the in the right proximal femoral artery with velocities of 456 cm/s. Monophasic waveforms and low velocities beyond the stenosis.  2.  On the left side, monophasic waveforms and lower velocities throughout the left lower extremity. Suspect a more proximal stenosis.  3.  Absent flow in the dorsalis pedis arteries bilaterally and in the left posterior tibial artery. Ankle-brachial indices could not be calcified due to calcified vessels.\"    Will request images to be pushed to PACS, reports in Care Everywhere.    Pt needs to be scheduled for consult with IR or Vascular Surgery.  Will route to scheduling to coordinate an appointment at next available.    MITCHEL DeanN RN    "

## 2019-04-30 ENCOUNTER — OFFICE VISIT (OUTPATIENT)
Dept: UROLOGY | Facility: CLINIC | Age: 84
End: 2019-04-30
Attending: FAMILY MEDICINE
Payer: MEDICARE

## 2019-04-30 VITALS — OXYGEN SATURATION: 97 % | HEART RATE: 93 BPM

## 2019-04-30 DIAGNOSIS — R33.9 URINARY RETENTION: Primary | ICD-10-CM

## 2019-04-30 DIAGNOSIS — Z90.79 S/P TURP (STATUS POST TRANSURETHRAL RESECTION OF PROSTATE): ICD-10-CM

## 2019-04-30 DIAGNOSIS — R39.9 LOWER URINARY TRACT SYMPTOMS (LUTS): ICD-10-CM

## 2019-04-30 PROCEDURE — 99203 OFFICE O/P NEW LOW 30 MIN: CPT | Performed by: UROLOGY

## 2019-04-30 RX ORDER — ASPIRIN 81 MG/1
81 TABLET ORAL DAILY
COMMUNITY
Start: 2019-04-22

## 2019-04-30 ASSESSMENT — PAIN SCALES - GENERAL: PAINLEVEL: NO PAIN (0)

## 2019-04-30 NOTE — PROGRESS NOTES
Urology Consult History and Physical  JERMAIN KAPOOR   Name: Cam Gonzalez    MRN: [id number]   YOB: 1930       We were asked to see Cam Gonzalez at the request of Dr. Cueto for evaluation and treatment of long standing urinary retention.        Chief Complaint:   Long standing urinary retention    History is obtained from the patient and his daughter.            History of Present Illness:   Cam Gonzalez is a 89 year old male who is being seen for evaluation of long standing BPH with LUTS and urinary retention performing intermittent self catheterization. He has history of Transurethral resection of the prostate with Dr. Kaplan at [facility] on 10/6/2016.  He has been doing fairly well with intermittent self-catheterization every 6 hours.  He recently was hospitalized for a UTI and pneumonia, and follows up for this.  He notes that he had been previously instructed to use 1 catheter repeatedly, however has been working with the catheter supply company for new catheters.  He notes that he does not void in between catheterizations.    (4 or >)  Location: Bladder   Quality: urinary retention requiring intermittent self catheterization   Severity: intermittent self catheterization   Duration: years           Past Medical History:     Past Medical History:   Diagnosis Date     NO ACTIVE PROBLEMS             Past Surgical History:   No past surgical history on file.         Social History:     Social History     Tobacco Use     Smoking status: Former Smoker     Last attempt to quit: 1990     Years since quittin.3     Smokeless tobacco: Never Used   Substance Use Topics     Alcohol use: No     Alcohol/week: 0.0 oz     Comment: rarely has a beer       History   Smoking Status     Former Smoker     Quit date: 1990   Smokeless Tobacco     Never Used            Family History:     Family History   Problem Relation Age of Onset     Prostate Cancer Father                Allergies:   No Known Allergies         Medications:     Current Outpatient Medications   Medication Sig     aspirin 81 MG EC tablet Take 81 mg by mouth daily     Cholecalciferol (VITAMIN D3 PO) Take 2,000 Units by mouth daily     ferrous sulfate (IRON) 325 (65 Fe) MG tablet TAKE 1 TABLET BY MOUTH EVERY OTHER DAY     order for DME Equipment being ordered: self catheter supplies     Probiotic Product (PROBIOTIC DAILY PO)      No current facility-administered medications for this visit.              Review of Systems:     Skin: negative  Eyes: glasses  Ears/Nose/Throat: negative  Respiratory: No shortness of breath, dyspnea on exertion, cough, or hemoptysis  Cardiovascular: negative  Gastrointestinal: negative  Genitourinary: as above  Musculoskeletal: negative  Neurologic: negative  Psychiatric: negative  Hematologic/Lymphatic/Immunologic: negative  Endocrine: negative          Physical Exam:   No data found.  There is no height or weight on file to calculate BMI.     General: age-appropriate appearing male in NAD  HEENT: Head AT/NC, EOMI, CN Grossly intact  Lungs: no respiratory distress, or pursed lip breathing  Heart: No obvious jugular venous distension present  Back: no bony midline tenderness, no CVAT bilaterally.  Abdomen: soft, non-distended, non-tender. No organomegaly  Lymph: no palpable inguinal lymphadenopathy.  LE: no edema.   Musculoskeltal: extremities normal, no peripheral edema  Skin: no suspicious lesions or rashes  Neuro: Alert, oriented, speech and mentation normal;  moving all 4 extremities equally.  Psych: affect and mood normal          Data:   All laboratory data reviewed:    UA RESULTS:  Recent Labs   Lab Test 01/19/17  1348   COLOR Yellow   APPEARANCE Cloudy   URINEGLC Negative   URINEBILI Negative   URINEKETONE Negative   SG 1.015   UBLD Moderate*   URINEPH 6.0   PROTEIN 100*   NITRITE Negative   LEUKEST Large*   RBCU 2-5*   WBCU >100*      PSA   Date Value Ref Range Status   02/22/2016  5.19 (H) 0 - 4 ug/L Final      Lab Results   Component Value Date    CR 2.70 04/26/2019           Impression and Plan:   Impression:   89 year old man with history of BPH with urinary retention requiring intermittent self catheterization, s/p Transurethral resection of the prostate 10/2016 and recent UTI      Plan:   Urinary retention requiring intermittent self catheterization   -We discussed that his overall kidney function has been stable and improved over the past years.  We discussed the options  -For continued intermittent self-catheterization versus work-up for potential repeat bladder outlet procedure.  Further work-up  will require cystoscopy and potential urodynamics.  He is not interested in this at this time.  -We discussed increasing the frequency of his intermittent self-catheterization to every 4 hours during the day and that this may help to decrease the risk of urinary tract infection.  He is amenable to this     Thank you for the kind consultation.    Time spent: 30 minutes of which >50% was spent counseling.    Jean Claude Voss MD   Urology  [facility]  [facility] Phone: [phone]  [facility] Phone: [phone]

## 2019-04-30 NOTE — NURSING NOTE
Cam Gonzalez's goals for this visit include:   Chief Complaint   Patient presents with     Consult     UTI       He requests these members of his care team be copied on today's visit information: Yes    PCP: Jacquelin Cueto    Referring Provider:  Jacquelin Cueto MD  [address] [place] [address]    Pulse 93   SpO2 97%     Do you need any medication refills at today's visit? No

## 2019-04-30 NOTE — Clinical Note
Madi Cueto,I saw Cam, along with his daughter, yesterday in consultation for his urinary retention requiring intermittent self-catheterization and recent UTI.  We discussed several treatment options, however he does not wish to proceed with any further bladder outlet procedure at this time.  I recommend increasing the frequency of his intermittent self-catheterization to every 4 hours, and he is amenable to this.Please let me know if you have any questions or concerns.Thanks, Jean Claude Voss M.D.Cell: [phone]

## 2019-05-02 ENCOUNTER — OFFICE VISIT (OUTPATIENT)
Dept: PODIATRY | Facility: CLINIC | Age: 84
End: 2019-05-02
Attending: FAMILY MEDICINE
Payer: MEDICARE

## 2019-05-02 DIAGNOSIS — B35.1 ONYCHOMYCOSIS: Primary | ICD-10-CM

## 2019-05-02 DIAGNOSIS — N18.4 CKD (CHRONIC KIDNEY DISEASE) STAGE 4, GFR 15-29 ML/MIN (H): ICD-10-CM

## 2019-05-02 DIAGNOSIS — B35.3 TINEA PEDIS OF BOTH FEET: ICD-10-CM

## 2019-05-02 DIAGNOSIS — I73.9 PAD (PERIPHERAL ARTERY DISEASE) (H): ICD-10-CM

## 2019-05-02 PROCEDURE — 99202 OFFICE O/P NEW SF 15 MIN: CPT | Performed by: PODIATRIST

## 2019-05-02 NOTE — LETTER
2019         RE: Cam Gonzalez  [address] [facility] [address]        Dear Colleague,    Thank you for referring your patient, Cam Gonzalez, to the [facility]. Please see a copy of my visit note below.    Past Medical History:   Diagnosis Date     NO ACTIVE PROBLEMS      Patient Active Problem List   Diagnosis     Renal failure     Syncope, unspecified syncope type     Acute renal failure, unspecified acute renal failure type (H)     Bilateral hydronephrosis     Asymptomatic cholelithiasis     Enlarged prostate     Mild concentric left ventricular hypertrophy (LVH)     Anemia of chronic disease     Vitamin D deficiency     Urinary retention     Elevated blood pressure reading without diagnosis of hypertension     Carotid stenosis, asymptomatic, bilateral     Sinus bradycardia     HTN (hypertension)     Second degree AV block, Mobitz type II     S/P placement of cardiac pacemaker     Syncope and collapse     CKD (chronic kidney disease) stage 4, GFR 15-29 ml/min (H)     Obstructive uropathy     ACP (advance care planning)     Hill catheter in place     S/P TURP (status post transurethral resection of prostate)     PAD (peripheral artery disease) (H)     Nail dystrophy     Urinary retention with incomplete bladder emptying     No past surgical history on file.  Social History     Socioeconomic History     Marital status:      Spouse name: Not on file     Number of children: Not on file     Years of education: Not on file     Highest education level: Not on file   Occupational History     Not on file   Social Needs     Financial resource strain: Not on file     Food insecurity:     Worry: Not on file     Inability: Not on file     Transportation needs:     Medical: Not on file     Non-medical: Not on file   Tobacco Use     Smoking status: Former Smoker     Last attempt to quit: 1990     Years since quittin.3     Smokeless tobacco: Never Used    Substance and Sexual Activity     Alcohol use: No     Alcohol/week: 0.0 oz     Comment: rarely has a beer     Drug use: No     Sexual activity: Not on file   Lifestyle     Physical activity:     Days per week: Not on file     Minutes per session: Not on file     Stress: Not on file   Relationships     Social connections:     Talks on phone: Not on file     Gets together: Not on file     Attends Episcopal service: Not on file     Active member of club or organization: Not on file     Attends meetings of clubs or organizations: Not on file     Relationship status: Not on file     Intimate partner violence:     Fear of current or ex partner: Not on file     Emotionally abused: Not on file     Physically abused: Not on file     Forced sexual activity: Not on file   Other Topics Concern      Service Not Asked     Blood Transfusions Not Asked     Caffeine Concern Not Asked     Occupational Exposure Not Asked     Hobby Hazards Not Asked     Sleep Concern Not Asked     Stress Concern Not Asked     Weight Concern Not Asked     Special Diet No     Back Care Not Asked     Exercise Yes     Comment: walks every day      Bike Helmet Not Asked     Seat Belt Not Asked     Self-Exams Not Asked     Parent/sibling w/ CABG, MI or angioplasty before 65F 55M? Not Asked   Social History Narrative     Not on file     Family History   Problem Relation Age of Onset     Prostate Cancer Father      Lab Results   Component Value Date    WBC 9.8 04/26/2019     Lab Results   Component Value Date    RBC 4.19 04/26/2019     Lab Results   Component Value Date    HGB 12.1 04/26/2019     Lab Results   Component Value Date    HCT 39.2 04/26/2019     No components found for: MCT  Lab Results   Component Value Date    MCV 94 04/26/2019     Lab Results   Component Value Date    MCH 28.9 04/26/2019     Lab Results   Component Value Date    MCHC 30.9 04/26/2019     Lab Results   Component Value Date    RDW 14.4 04/26/2019     Lab Results    Component Value Date     04/26/2019     Last Comprehensive Metabolic Panel:  Sodium   Date Value Ref Range Status   04/26/2019 137 133 - 144 mmol/L Final     Potassium   Date Value Ref Range Status   04/26/2019 3.8 3.4 - 5.3 mmol/L Final     Chloride   Date Value Ref Range Status   04/26/2019 106 94 - 109 mmol/L Final     Carbon Dioxide   Date Value Ref Range Status   04/26/2019 22 20 - 32 mmol/L Final     Anion Gap   Date Value Ref Range Status   04/26/2019 9 3 - 14 mmol/L Final     Glucose   Date Value Ref Range Status   04/26/2019 114 (H) 70 - 99 mg/dL Final     Urea Nitrogen   Date Value Ref Range Status   04/26/2019 44 (H) 7 - 30 mg/dL Final     Creatinine   Date Value Ref Range Status   04/26/2019 2.70 (H) 0.66 - 1.25 mg/dL Final     GFR Estimate   Date Value Ref Range Status   04/26/2019 20 (L) >60 mL/min/[1.73_m2] Final     Comment:     Non  GFR Calc  Starting 12/18/2018, serum creatinine based estimated GFR (eGFR) will be   calculated using the Chronic Kidney Disease Epidemiology Collaboration   (CKD-EPI) equation.       Calcium   Date Value Ref Range Status   04/26/2019 9.1 8.5 - 10.1 mg/dL Final     SUBJECTIVE FINDINGS:  This 89-year-old male presents for toenail care.  He relates he cannot cut them himself.  It has been quite a while since he has had them trimmed.  He relates they only hurt if his sock catches on them or if there is too much pressure on them.  Relates no injuries, no specific relieving or aggravating factors.       OBJECTIVE FINDINGS:  PT is 1/4 bilaterally, DP is 0/4 bilaterally.  He has thin shiny skin bilaterally.  He has varicosities bilaterally, decreased hair growth bilaterally. CFT is less than 3 seconds bilaterally.  He has dry scaly skin bilaterally.  He has long incurvated dystrophic thickened brittle nails with subungual debris, discoloration and dystrophy with pressure changes in the hallux nail borders bilaterally 1-5 to differing degrees.         ASSESSMENT AND PLAN:  Onychomycosis bilaterally.  He has peripheral arterial occlusive disease and renal disease.  Diagnosis and treatment options discussed with him.  All the nails bilaterally were debrided upon consent.  Prescription for Loprox cream given and use discussed with him.  He relates he has an appointment next week with Vascular for his peripheral arterial disease.  He will follow up with them as scheduled and return to clinic to see me in 2-3 months.   Also has tinea pedis bilaterally.           Again, thank you for allowing me to participate in the care of your patient.        Sincerely,        Alfredo Babcock DPM

## 2019-05-02 NOTE — NURSING NOTE
Cam Gonzalez's chief complaint for this visit includes:  Chief Complaint   Patient presents with     Right Foot - Fungal Infection     Left Foot - Fungal Infection     PCP: Jacquelin Cueto    Referring Provider:  Jacquelin Cueto MD  [address] [place] [address]    There were no vitals taken for this visit.  Data Unavailable     Do you need any medication refills at today's visit? No    Nadya Moreno LPN

## 2019-05-02 NOTE — PROGRESS NOTES
Past Medical History:   Diagnosis Date     NO ACTIVE PROBLEMS      Patient Active Problem List   Diagnosis     Renal failure     Syncope, unspecified syncope type     Acute renal failure, unspecified acute renal failure type (H)     Bilateral hydronephrosis     Asymptomatic cholelithiasis     Enlarged prostate     Mild concentric left ventricular hypertrophy (LVH)     Anemia of chronic disease     Vitamin D deficiency     Urinary retention     Elevated blood pressure reading without diagnosis of hypertension     Carotid stenosis, asymptomatic, bilateral     Sinus bradycardia     HTN (hypertension)     Second degree AV block, Mobitz type II     S/P placement of cardiac pacemaker     Syncope and collapse     CKD (chronic kidney disease) stage 4, GFR 15-29 ml/min (H)     Obstructive uropathy     ACP (advance care planning)     Hill catheter in place     S/P TURP (status post transurethral resection of prostate)     PAD (peripheral artery disease) (H)     Nail dystrophy     Urinary retention with incomplete bladder emptying     No past surgical history on file.  Social History     Socioeconomic History     Marital status:      Spouse name: Not on file     Number of children: Not on file     Years of education: Not on file     Highest education level: Not on file   Occupational History     Not on file   Social Needs     Financial resource strain: Not on file     Food insecurity:     Worry: Not on file     Inability: Not on file     Transportation needs:     Medical: Not on file     Non-medical: Not on file   Tobacco Use     Smoking status: Former Smoker     Last attempt to quit: 1990     Years since quittin.3     Smokeless tobacco: Never Used   Substance and Sexual Activity     Alcohol use: No     Alcohol/week: 0.0 oz     Comment: rarely has a beer     Drug use: No     Sexual activity: Not on file   Lifestyle     Physical activity:     Days per week: Not on file     Minutes per session: Not on file      Stress: Not on file   Relationships     Social connections:     Talks on phone: Not on file     Gets together: Not on file     Attends Adventism service: Not on file     Active member of club or organization: Not on file     Attends meetings of clubs or organizations: Not on file     Relationship status: Not on file     Intimate partner violence:     Fear of current or ex partner: Not on file     Emotionally abused: Not on file     Physically abused: Not on file     Forced sexual activity: Not on file   Other Topics Concern      Service Not Asked     Blood Transfusions Not Asked     Caffeine Concern Not Asked     Occupational Exposure Not Asked     Hobby Hazards Not Asked     Sleep Concern Not Asked     Stress Concern Not Asked     Weight Concern Not Asked     Special Diet No     Back Care Not Asked     Exercise Yes     Comment: walks every day      Bike Helmet Not Asked     Seat Belt Not Asked     Self-Exams Not Asked     Parent/sibling w/ CABG, MI or angioplasty before 65F 55M? Not Asked   Social History Narrative     Not on file     Family History   Problem Relation Age of Onset     Prostate Cancer Father      Lab Results   Component Value Date    WBC 9.8 04/26/2019     Lab Results   Component Value Date    RBC 4.19 04/26/2019     Lab Results   Component Value Date    HGB 12.1 04/26/2019     Lab Results   Component Value Date    HCT 39.2 04/26/2019     No components found for: MCT  Lab Results   Component Value Date    MCV 94 04/26/2019     Lab Results   Component Value Date    MCH 28.9 04/26/2019     Lab Results   Component Value Date    MCHC 30.9 04/26/2019     Lab Results   Component Value Date    RDW 14.4 04/26/2019     Lab Results   Component Value Date     04/26/2019     Last Comprehensive Metabolic Panel:  Sodium   Date Value Ref Range Status   04/26/2019 137 133 - 144 mmol/L Final     Potassium   Date Value Ref Range Status   04/26/2019 3.8 3.4 - 5.3 mmol/L Final     Chloride   Date Value  Ref Range Status   04/26/2019 106 94 - 109 mmol/L Final     Carbon Dioxide   Date Value Ref Range Status   04/26/2019 22 20 - 32 mmol/L Final     Anion Gap   Date Value Ref Range Status   04/26/2019 9 3 - 14 mmol/L Final     Glucose   Date Value Ref Range Status   04/26/2019 114 (H) 70 - 99 mg/dL Final     Urea Nitrogen   Date Value Ref Range Status   04/26/2019 44 (H) 7 - 30 mg/dL Final     Creatinine   Date Value Ref Range Status   04/26/2019 2.70 (H) 0.66 - 1.25 mg/dL Final     GFR Estimate   Date Value Ref Range Status   04/26/2019 20 (L) >60 mL/min/[1.73_m2] Final     Comment:     Non  GFR Calc  Starting 12/18/2018, serum creatinine based estimated GFR (eGFR) will be   calculated using the Chronic Kidney Disease Epidemiology Collaboration   (CKD-EPI) equation.       Calcium   Date Value Ref Range Status   04/26/2019 9.1 8.5 - 10.1 mg/dL Final     SUBJECTIVE FINDINGS:  This 89-year-old male presents for toenail care.  He relates he cannot cut them himself.  It has been quite a while since he has had them trimmed.  He relates they only hurt if his sock catches on them or if there is too much pressure on them.  Relates no injuries, no specific relieving or aggravating factors.       OBJECTIVE FINDINGS:  PT is 1/4 bilaterally, DP is 0/4 bilaterally.  He has thin shiny skin bilaterally.  He has varicosities bilaterally, decreased hair growth bilaterally. CFT is less than 3 seconds bilaterally.  He has dry scaly skin bilaterally.  He has long incurvated dystrophic thickened brittle nails with subungual debris, discoloration and dystrophy with pressure changes in the hallux nail borders bilaterally 1-5 to differing degrees.        ASSESSMENT AND PLAN:  Onychomycosis bilaterally.  He has peripheral arterial occlusive disease and renal disease.  Diagnosis and treatment options discussed with him.  All the nails bilaterally were debrided upon consent.  Prescription for Loprox cream given and use discussed  with him.  He relates he has an appointment next week with Vascular for his peripheral arterial disease.  He will follow up with them as scheduled and return to clinic to see me in 2-3 months.   Also has tinea pedis bilaterally.

## 2019-05-02 NOTE — PATIENT INSTRUCTIONS
Thanks for coming today.  [facility]  [address] [place] [address]    To schedule future appointments in Ortho Clinic, you may call [phone].    To schedule ordered imaging by your provider:   Call [facility] [phone]    To schedule an injection ordered by your provider:  Call [facility] Injection scheduling line: [phone]  Andrew Alliancehart available online at:  [email or web address]    Please call if any further questions or concerns ([phone]).  Clinic hours 8 am to 5 pm.    Return to clinic (call) if symptoms worsen or fail to improve.

## 2019-05-08 ENCOUNTER — OFFICE VISIT (OUTPATIENT)
Dept: OTHER | Facility: CLINIC | Age: 84
End: 2019-05-08
Attending: FAMILY MEDICINE
Payer: MEDICARE

## 2019-05-08 VITALS — SYSTOLIC BLOOD PRESSURE: 110 MMHG | DIASTOLIC BLOOD PRESSURE: 65 MMHG | HEART RATE: 96 BPM

## 2019-05-08 DIAGNOSIS — I73.9 PAD (PERIPHERAL ARTERY DISEASE) (H): ICD-10-CM

## 2019-05-08 PROCEDURE — 99203 OFFICE O/P NEW LOW 30 MIN: CPT | Mod: ZP | Performed by: SURGERY

## 2019-05-08 PROCEDURE — G0463 HOSPITAL OUTPT CLINIC VISIT: HCPCS

## 2019-05-08 NOTE — LETTER
[facility] [address]  [place] [address]  Phone: [phone]  Fax: [phone]      May 8, 2019       Re: Cam RODRÍGUEZ Leonardmary - 1930    88 y/o male  With  Significant PAD by ultrasound exam--ABIs could not be determined due  To non compressible vessels.  No palpable pedal pulses  On exam-motor/.sensory function  Intact. His  Feet appear cyanotic but he states they  have appeared that way  For years.  He denies Ischemic rest pain,  Lifestyle  Limiting claudication, and any chronic non healing wounds which Are  The criteria for intervention in patients with  PAD.  I have discussed  This  At length  With the patient and his family.  He also  Has  A creatinine  Of 2.70 and GFR of 20--angiography is not totally  contraindicated but precautions  would be necessary to avoid renal  Failure. Discussed the importance of foot care and  Avoiding trauma.  If he develops wounds or ischemic pain will be happy to reevaluate and determine a course  Of action.     Jhonny Menard MD

## 2019-05-08 NOTE — NURSING NOTE
"Cam Gonzalez is a 89 year old male who presents for:  Chief Complaint   Patient presents with     Consult     New Consult for PAD Referred by Dr Cueto 04/29/19 JASIEL         Vitals:    Vitals:    05/08/19 1256 05/08/19 1257   BP: 111/70 110/65   BP Location: Right arm Left arm   Patient Position: Chair Chair   Cuff Size: Adult Regular Adult Regular   Pulse: 125 96       BMI:  Estimated body mass index is 22.05 kg/m  as calculated from the following:    Height as of 4/26/19: 5' 9\" (1.753 m).    Weight as of 4/26/19: 149 lb 4.8 oz (67.7 kg).    Pain Score:  Data Unavailable        Viviana Esposito MA    "

## 2019-05-08 NOTE — PROGRESS NOTES
89 y/o male  With  Significant PAD by ultrasound exam--ABIs could not be determined due  To non compressible vessels.  No palpable pedal pulses  On exam-motor/.sensory function  Intact.  His  Feet appear cyanotic but he states they  have appeared that way  For years.  He denies   Ischemic rest pain,  Lifestyle  Limiting claudication, and any chronic non healing wounds which  Are  The criteria for intervention in patients with  PAD.  I have discussed  This  At length  With the patient and his family.  He also  Has  A creatinine  Of 2.70 and GFR of 20--angiography is not totally  contraindicated but precautions  would be necessary to avoid renal  Failure. Discussed the importance of foot care and  Avoiding trauma.  If he develops wounds or ischemic pain will be happy to reevaluate and determine a course  Of action.  Face to face time 30 minutes greater than 50% in consultation.

## 2019-05-14 ENCOUNTER — TELEPHONE (OUTPATIENT)
Dept: PEDIATRICS | Facility: CLINIC | Age: 84
End: 2019-05-14

## 2019-05-14 DIAGNOSIS — N39.0 RECURRENT UTI: Primary | ICD-10-CM

## 2019-05-14 NOTE — TELEPHONE ENCOUNTER
Daughter, Meagan Gil, called requesting a lab order for UA/UC. States she was advise by PCP that if she became suspicious of a UTI they could come in and drop off a sample.  Meagan Gil is suspicious for a UTI as Cam is presenting with similar sx as with previous UTI's. Patient is tired, has a decreased appetite, and cloudy urine. No pain, fever, or blood in the urine reported.     Patient self-caths and was recently seen by urology. He was encouraged to cath every 4 hours, however, daughter is suspicious he is not doing this.    Routing to provider to review and advise.     Mahnaz Saeed RN   .[facility]

## 2019-05-14 NOTE — TELEPHONE ENCOUNTER
Called daughter and discussed the message below. They will follow up and call with any other questions or concerns.     Mahnaz Saeed RN   .[facility]

## 2019-05-15 DIAGNOSIS — N39.0 RECURRENT UTI: ICD-10-CM

## 2019-05-15 LAB
ALBUMIN UR-MCNC: 30 MG/DL
APPEARANCE UR: ABNORMAL
BACTERIA #/AREA URNS HPF: ABNORMAL /HPF
BILIRUB UR QL STRIP: NEGATIVE
COLOR UR AUTO: ABNORMAL
GLUCOSE UR STRIP-MCNC: NEGATIVE MG/DL
HGB UR QL STRIP: ABNORMAL
KETONES UR STRIP-MCNC: NEGATIVE MG/DL
LEUKOCYTE ESTERASE UR QL STRIP: ABNORMAL
NITRATE UR QL: NEGATIVE
PH UR STRIP: 6 PH (ref 5–7)
RBC #/AREA URNS AUTO: ABNORMAL /HPF
SOURCE: ABNORMAL
SP GR UR STRIP: 1.01 (ref 1–1.03)
UROBILINOGEN UR STRIP-MCNC: NORMAL MG/DL (ref 0–2)
WBC #/AREA URNS AUTO: ABNORMAL /HPF
WBC CLUMPS #/AREA URNS HPF: PRESENT /HPF

## 2019-05-15 PROCEDURE — 81001 URINALYSIS AUTO W/SCOPE: CPT | Performed by: FAMILY MEDICINE

## 2019-05-15 PROCEDURE — 87086 URINE CULTURE/COLONY COUNT: CPT | Performed by: FAMILY MEDICINE

## 2019-05-15 PROCEDURE — 87186 SC STD MICRODIL/AGAR DIL: CPT | Performed by: FAMILY MEDICINE

## 2019-05-15 PROCEDURE — 87088 URINE BACTERIA CULTURE: CPT | Performed by: FAMILY MEDICINE

## 2019-05-17 LAB
BACTERIA SPEC CULT: ABNORMAL
SPECIMEN SOURCE: ABNORMAL

## 2019-05-17 RX ORDER — CEFUROXIME AXETIL 500 MG/1
500 TABLET ORAL 2 TIMES DAILY
Qty: 20 TABLET | Refills: 0 | Status: SHIPPED | OUTPATIENT
Start: 2019-05-17 | End: 2019-06-11

## 2019-05-17 NOTE — RESULT ENCOUNTER NOTE
Please inform patient of urine culture results-E. coli infection, prescription sent for Ceftin to take twice a day for the next 10 days  If symptoms are not any better in 2 weeks, he will follow-up in the clinic

## 2019-05-31 ENCOUNTER — TELEPHONE (OUTPATIENT)
Dept: PEDIATRICS | Facility: CLINIC | Age: 84
End: 2019-05-31

## 2019-05-31 NOTE — TELEPHONE ENCOUNTER
[facility] Call [facility]    Phone Message    May a detailed message be left on voicemail: yes    Reason for Call: Other: Pt daughter, Meagan Gil, is requesting a call to speak to Dr. Cueto about pt. Meagan Gil is concerned pt has C-diff again as he has had diarrhea for the past 5 days after finishing his antibiotics last Sunday. Meagan Gil is also concerned pt could be dehydrated. Please advise.      Action Taken: Message routed to:  Primary Care p [phone]

## 2019-05-31 NOTE — TELEPHONE ENCOUNTER
"No fever.  Diarrhea 2-3 times per day x5 days. He thought he was getting better and then it starts again.   Recently completed abx for UTI.  Drinking but \"it's like pulling teeth\", decreased urinary output in cath, dry mouth.   Doesn't want to go to ER. [facility] and advised ER and daughter will take him now.  Jasmin Rivero RN   "

## 2019-06-11 ENCOUNTER — OFFICE VISIT (OUTPATIENT)
Dept: PEDIATRICS | Facility: CLINIC | Age: 84
End: 2019-06-11
Payer: MEDICARE

## 2019-06-11 VITALS
OXYGEN SATURATION: 100 % | DIASTOLIC BLOOD PRESSURE: 70 MMHG | WEIGHT: 148.1 LBS | HEART RATE: 62 BPM | TEMPERATURE: 96.7 F | SYSTOLIC BLOOD PRESSURE: 117 MMHG | BODY MASS INDEX: 21.87 KG/M2

## 2019-06-11 DIAGNOSIS — N13.9 OBSTRUCTIVE UROPATHY: ICD-10-CM

## 2019-06-11 DIAGNOSIS — Z09 HOSPITAL DISCHARGE FOLLOW-UP: Primary | ICD-10-CM

## 2019-06-11 DIAGNOSIS — E87.8 ELECTROLYTE ABNORMALITY: ICD-10-CM

## 2019-06-11 DIAGNOSIS — N17.9 ACUTE RENAL FAILURE, UNSPECIFIED ACUTE RENAL FAILURE TYPE (H): ICD-10-CM

## 2019-06-11 DIAGNOSIS — A04.72 COLITIS DUE TO CLOSTRIDIUM DIFFICILE: ICD-10-CM

## 2019-06-11 DIAGNOSIS — N32.89 BLADDER WALL THICKENING: ICD-10-CM

## 2019-06-11 LAB
ANION GAP SERPL CALCULATED.3IONS-SCNC: 10 MMOL/L (ref 3–14)
BASOPHILS # BLD AUTO: 0.1 10E9/L (ref 0–0.2)
BASOPHILS NFR BLD AUTO: 0.4 %
BUN SERPL-MCNC: 33 MG/DL (ref 7–30)
CALCIUM SERPL-MCNC: 8.9 MG/DL (ref 8.5–10.1)
CHLORIDE SERPL-SCNC: 108 MMOL/L (ref 94–109)
CO2 SERPL-SCNC: 20 MMOL/L (ref 20–32)
CREAT SERPL-MCNC: 2.72 MG/DL (ref 0.66–1.25)
DIFFERENTIAL METHOD BLD: ABNORMAL
EOSINOPHIL # BLD AUTO: 0.1 10E9/L (ref 0–0.7)
EOSINOPHIL NFR BLD AUTO: 0.4 %
ERYTHROCYTE [DISTWIDTH] IN BLOOD BY AUTOMATED COUNT: 15 % (ref 10–15)
GFR SERPL CREATININE-BSD FRML MDRD: 20 ML/MIN/{1.73_M2}
GLUCOSE SERPL-MCNC: 111 MG/DL (ref 70–99)
HCT VFR BLD AUTO: 34.3 % (ref 40–53)
HGB BLD-MCNC: 10.8 G/DL (ref 13.3–17.7)
IMM GRANULOCYTES # BLD: 0.1 10E9/L (ref 0–0.4)
IMM GRANULOCYTES NFR BLD: 0.8 %
LYMPHOCYTES # BLD AUTO: 0.5 10E9/L (ref 0.8–5.3)
LYMPHOCYTES NFR BLD AUTO: 4 %
MAGNESIUM SERPL-MCNC: 1.9 MG/DL (ref 1.6–2.3)
MCH RBC QN AUTO: 28.3 PG (ref 26.5–33)
MCHC RBC AUTO-ENTMCNC: 31.5 G/DL (ref 31.5–36.5)
MCV RBC AUTO: 90 FL (ref 78–100)
MONOCYTES # BLD AUTO: 1 10E9/L (ref 0–1.3)
MONOCYTES NFR BLD AUTO: 8.8 %
NEUTROPHILS # BLD AUTO: 9.7 10E9/L (ref 1.6–8.3)
NEUTROPHILS NFR BLD AUTO: 85.6 %
PLATELET # BLD AUTO: 338 10E9/L (ref 150–450)
POTASSIUM SERPL-SCNC: 4.3 MMOL/L (ref 3.4–5.3)
RBC # BLD AUTO: 3.82 10E12/L (ref 4.4–5.9)
SODIUM SERPL-SCNC: 138 MMOL/L (ref 133–144)
WBC # BLD AUTO: 11.3 10E9/L (ref 4–11)

## 2019-06-11 PROCEDURE — 36415 COLL VENOUS BLD VENIPUNCTURE: CPT | Performed by: FAMILY MEDICINE

## 2019-06-11 PROCEDURE — 80048 BASIC METABOLIC PNL TOTAL CA: CPT | Performed by: FAMILY MEDICINE

## 2019-06-11 PROCEDURE — 83735 ASSAY OF MAGNESIUM: CPT | Performed by: FAMILY MEDICINE

## 2019-06-11 PROCEDURE — 85025 COMPLETE CBC W/AUTO DIFF WBC: CPT | Performed by: FAMILY MEDICINE

## 2019-06-11 PROCEDURE — 99496 TRANSJ CARE MGMT HIGH F2F 7D: CPT | Performed by: FAMILY MEDICINE

## 2019-06-11 NOTE — PROGRESS NOTES
Subjective     Cam Gonzalez is a 89 year old male who presents to clinic today for the following health issues:    [facility]       Hospital Follow-up Visit:    Patient with past medical history significant for chronic obstructive uropathy from urinary retention, status post  Hill in place, iron deficiency anemia, history of recurrent UTI is here for hospital discharge follow-up with his daughter after having diarrhea there was diagnosed from C-diff colitis.  Patient was recently treated with Ceftin for his recent episode of UTI following which she developed worsening diarrhea and dehydration  Patient was seen at the [facility], was diagnosed with acute kidney failure with significantly elevated blood urea nitrogen creatinine and electronic abnormality including low potassium, low magnesium and low sodium  Patient currently denies nausea, vomiting, has started getting formed stool since yesterday, had one stool this morning.  Denies abdominal pain, bloating, rectal bleeding, blood in stool.  Denies fever, chills, abnormal skin rashes  Patient just finished the 7-day course of vancomycin last night    Hospital/Nursing Home/IP Rehab Facility: [facility]  Date of Admission: 5/31/19  Date of Discharge: 6/4/19  Reason(s) for Admission: c.diff            Problems taking medications regularly:  None       Medication changes since discharge: vancomycin 25 mg/mL oral Recon - completed course already       Problems adhering to non-medication therapy:  None    Summary of hospitalization:  CareEverywhere information obtained and reviewed  Diagnostic Tests/Treatments reviewed.  Follow up needed: Lab recheck  Other Healthcare Providers Involved in Patient s Care:         Dr. Perez in urology at the [facility], [place], operated by [facility]  Update since discharge: stable.     Post Discharge Medication Reconciliation: discharge medications reconciled, continue medications without change.  Plan of care communicated with  patient and patient's daughter     Coding guidelines for this visit:  Type of Medical   Decision Making Face-to-Face Visit       within 7 Days of discharge Face-to-Face Visit        within 14 days of discharge   Moderate Complexity [id number]   High Complexity [id number]                Patient Active Problem List   Diagnosis     Renal failure     Syncope, unspecified syncope type     Acute renal failure, unspecified acute renal failure type (H)     Bilateral hydronephrosis     Asymptomatic cholelithiasis     Enlarged prostate     Mild concentric left ventricular hypertrophy (LVH)     Anemia of chronic disease     Vitamin D deficiency     Urinary retention     Elevated blood pressure reading without diagnosis of hypertension     Carotid stenosis, asymptomatic, bilateral     Sinus bradycardia     HTN (hypertension)     Second degree AV block, Mobitz type II     S/P placement of cardiac pacemaker     Syncope and collapse     CKD (chronic kidney disease) stage 4, GFR 15-29 ml/min (H)     Obstructive uropathy     ACP (advance care planning)     Hill catheter in place     S/P TURP (status post transurethral resection of prostate)     PAD (peripheral artery disease) (H)     Nail dystrophy     Urinary retention with incomplete bladder emptying     Colitis due to Clostridium difficile     History reviewed. No pertinent surgical history.    Social History     Tobacco Use     Smoking status: Former Smoker     Last attempt to quit: 1990     Years since quittin.4     Smokeless tobacco: Never Used   Substance Use Topics     Alcohol use: No     Alcohol/week: 0.0 oz     Comment: rarely has a beer     Family History   Problem Relation Age of Onset     Prostate Cancer Father          Current Outpatient Medications   Medication Sig Dispense Refill     aspirin 81 MG EC tablet Take 81 mg by mouth daily       Cholecalciferol (VITAMIN D3 PO) Take 2,000 Units by mouth daily       ferrous sulfate (IRON) 325 (65 Fe) MG tablet  TAKE 1 TABLET BY MOUTH EVERY OTHER DAY 30 tablet 3     order for DME Equipment being ordered: self catheter supplies 30 Units 0     Probiotic Product (PROBIOTIC DAILY PO)        No Known Allergies  Recent Labs   Lab Test 04/26/19  1518 05/31/18  1013  09/21/16  1450  02/07/16  0606 02/07/16  0050 02/06/16  1450   ALT  --  18  --  41  --   --   --  16   CR 2.70* 3.03*   < > 3.35*   < > 4.93* 4.99* 4.95*   GFRESTIMATED 20* 20*   < > 18*   < > 11* 11* 11*   GFRESTBLACK 23* 24*   < > 21*   < > 14* 13* 14*   POTASSIUM 3.8 4.6   < > 3.8   < > 4.8  --  4.6   TSH  --   --   --   --   --  Canceled, Test credited   Duplicate request   Called to  [facility], SEE [id number] FOR RESULT   2.30  --     < > = values in this interval not displayed.      BP Readings from Last 3 Encounters:   06/11/19 117/70   05/08/19 110/65   04/26/19 121/70    Wt Readings from Last 3 Encounters:   06/11/19 67.2 kg (148 lb 1.6 oz)   04/26/19 67.7 kg (149 lb 4.8 oz)   05/31/18 75.7 kg (166 lb 14.4 oz)                      Reviewed and updated as needed this visit by Provider         Review of Systems   ROS COMP: CONSTITUTIONAL: NEGATIVE for fever, chills, change in weight  INTEGUMENTARY/SKIN: NEGATIVE for worrisome rashes, moles or lesions  RESP: NEGATIVE for significant cough or SOB  CV: NEGATIVE for chest pain, palpitations or peripheral edema  GI: as above  : as above  MUSCULOSKELETAL: NEGATIVE for significant arthralgias or myalgia  NEURO: NEGATIVE for weakness, dizziness or paresthesias  HEME/ALLERGY/IMMUNE: History of anemia  PSYCHIATRIC: NEGATIVE for changes in mood or affect      Objective    /70 (BP Location: Right arm, Patient Position: Sitting, Cuff Size: Adult Regular)   Pulse 62   Temp 96.7  F (35.9  C) (Temporal)   Wt 67.2 kg (148 lb 1.6 oz)   SpO2 100%   BMI 21.87 kg/m    Body mass index is 21.87 kg/m .  Physical Exam   GENERAL: healthy, alert and no distress  RESP: lungs clear to auscultation - no rales, rhonchi or  wheezes  CV: regular rate and rhythm, normal S1 S2, no S3 or S4, no murmur, click or rub, no peripheral edema and peripheral pulses strong  ABDOMEN: soft, non tender, no guarding or rigidity, no organomegaly, normal BS, no costovertebral angle tenderness  MS: Using cane for ambulation  SKIN: no suspicious lesions or rashes  BACK: no CVA tenderness, no paralumbar tenderness  PSYCH: mentation appears normal, affect normal/bright    Diagnostic Test Results:  Labs reviewed in Epic        Assessment & Plan     1. Hospital discharge follow-up  Reviewed documents and reports from care everywhere  Reviewed positive C. difficile test results, low hemoglobin, significantly elevated blood urea nitrogen and creatinine, low potassium, low magnesium  Patient symptoms are resolving at this time, continue to monitor.    Emphasized on hydration, push fluids including Pedialyte, Gatorade  Recommended to repeat labs for recheck today  Results for orders placed or performed in visit on 06/11/19   CBC with platelets and differential   Result Value Ref Range    WBC 11.3 (H) 4.0 - 11.0 10e9/L    RBC Count 3.82 (L) 4.4 - 5.9 10e12/L    Hemoglobin 10.8 (L) 13.3 - 17.7 g/dL    Hematocrit 34.3 (L) 40.0 - 53.0 %    MCV 90 78 - 100 fl    MCH 28.3 26.5 - 33.0 pg    MCHC 31.5 31.5 - 36.5 g/dL    RDW 15.0 10.0 - 15.0 %    Platelet Count 338 150 - 450 10e9/L    Diff Method Automated Method     % Neutrophils 85.6 %    % Lymphocytes 4.0 %    % Monocytes 8.8 %    % Eosinophils 0.4 %    % Basophils 0.4 %    % Immature Granulocytes 0.8 %    Absolute Neutrophil 9.7 (H) 1.6 - 8.3 10e9/L    Absolute Lymphocytes 0.5 (L) 0.8 - 5.3 10e9/L    Absolute Monocytes 1.0 0.0 - 1.3 10e9/L    Absolute Eosinophils 0.1 0.0 - 0.7 10e9/L    Absolute Basophils 0.1 0.0 - 0.2 10e9/L    Abs Immature Granulocytes 0.1 0 - 0.4 10e9/L   Magnesium   Result Value Ref Range    Magnesium 1.9 1.6 - 2.3 mg/dL   Basic metabolic panel  (Ca, Cl, CO2, Creat, Gluc, K, Na, BUN)   Result  Value Ref Range    Sodium 138 133 - 144 mmol/L    Potassium 4.3 3.4 - 5.3 mmol/L    Chloride 108 94 - 109 mmol/L    Carbon Dioxide 20 20 - 32 mmol/L    Anion Gap 10 3 - 14 mmol/L    Glucose 111 (H) 70 - 99 mg/dL    Urea Nitrogen 33 (H) 7 - 30 mg/dL    Creatinine 2.72 (H) 0.66 - 1.25 mg/dL    GFR Estimate 20 (L) >60 mL/min/[1.73_m2]    GFR Estimate If Black 23 (L) >60 mL/min/[1.73_m2]    Calcium 8.9 8.5 - 10.1 mg/dL     Noted stable RFT. improved and normal Magnesium, potassium, improved hemoglobin  Will monitor for now.  - CBC with platelets and differential  - Magnesium  - Basic metabolic panel  (Ca, Cl, CO2, Creat, Gluc, K, Na, BUN)    2. Colitis due to Clostridium difficile  Resolved, monitor    3. Acute renal failure, unspecified acute renal failure type (H)  as above    - CBC with platelets and differential  - Magnesium  - Basic metabolic panel  (Ca, Cl, CO2, Creat, Gluc, K, Na, BUN)    4. Electrolyte abnormality  as above    - CBC with platelets and differential  - Magnesium  - Basic metabolic panel  (Ca, Cl, CO2, Creat, Gluc, K, Na, BUN)    5. Obstructive uropathy  US RENAL5/31/2019  [facility]   Result Impression   IMPRESSION:    1.  Bilateral hydronephrosis, moderately severe on the right and moderate on the left.  2.  Lobular masslike thickening of the walls of the urinary bladder. Consider further evaluation with cystoscopy.       Reviewed renal ultrasound from 5/31/2019 showing bilateral hydronephrosis and a lobular masslike thickening of the walls of the urinary bladder  Hill in place, will follow-up with Dr. Perez in urology at [facility] in1 month for Hill exchange and possible cystoscopy    6. Bladder wall thickening  Comment:   Plan: as above          Chart documentation done in part with Dragon Voice recognition Software. Although reviewed after completion, some word and grammatical error may remain.    See Patient Instructions    No follow-ups on file.    MD SALVADOR Louie  [facility]

## 2019-06-11 NOTE — RESULT ENCOUNTER NOTE
Please inform Cam of lab results from today-improved and stable hemoglobin, normal potassium, normal magnesium stable kidney functions  Continue to follow-up with urology as scheduled

## 2019-06-17 ENCOUNTER — DOCUMENTATION ONLY (OUTPATIENT)
Dept: CARE COORDINATION | Facility: CLINIC | Age: 84
End: 2019-06-17

## 2019-06-17 DIAGNOSIS — D63.8 ANEMIA OF CHRONIC DISEASE: ICD-10-CM

## 2019-06-17 RX ORDER — FERROUS SULFATE 325(65) MG
TABLET ORAL
Qty: 30 TABLET | Refills: 3 | Status: SHIPPED | OUTPATIENT
Start: 2019-06-17

## 2019-06-17 NOTE — PROGRESS NOTES
Dear Dr. Cueto  Medicare Home Health regulations requires [facility] to provide an initial assessment visit either within 48 hours of the patient's return home, or on the physician ordered Start of Care date.    There will be a delay in the Initial Assessment for Cam Birdvaldemarmary; MRN [id number]  Patient has requested his Start of Care date occur on 6/18/19.      Sincerely [facility]  Rosemarie Vogel RN  [phone]

## 2019-06-17 NOTE — TELEPHONE ENCOUNTER
"Requested Prescriptions   Signed Prescriptions Disp Refills    ferrous sulfate (FEROSUL) 325 (65 Fe) MG tablet 30 tablet 3     Sig: TAKE 1 TABLET BY MOUTH EVERY OTHER DAY       Iron Supplements Passed - 6/17/2019  1:15 AM        Passed - Patient is 12 years of age or older        Passed - Recent (12 mo) or future (30 days) visit within the authorizing provider's specialty     Patient had office visit in the last 12 months or has a visit in the next 30 days with authorizing provider or within the authorizing provider's specialty.  See \"Patient Info\" tab in inbasket, or \"Choose Columns\" in Meds & Orders section of the refill encounter.              Passed - Hgb OR Hct on record within the past 12 mos.     Patient need only have had a HGB or HCT on file in the past 12 mos. That result does not need to be normal.    Recent Labs   Lab Test 06/11/19  1158 04/26/19  1518 05/31/18  1013   HGB 10.8* 12.1* 14.1     Recent Labs   Lab Test 06/11/19  1158 04/26/19  1518 05/31/18  1013   HCT 34.3* 39.2* 44.6       Please verify a HGB or HCT has been checked SINCE THE LAST DOSE CHANGE.            Passed - Medication is active on med list        Refill authorized per [facility] protocol.    Maryan Iqbal RN    "

## 2019-06-18 ENCOUNTER — DOCUMENTATION ONLY (OUTPATIENT)
Dept: CARE COORDINATION | Facility: CLINIC | Age: 84
End: 2019-06-18

## 2019-06-18 NOTE — PROGRESS NOTES
"..[facility] now requests orders and shares plan of care/discharge summaries for some patients through [employer].  Please REPLY TO THIS MESSAGE OR ROUTE BACK TO THE AUTHOR in order to give authorization for orders when needed.  This is considered a verbal order, you will still receive a faxed copy of orders for signature.  Thank you for your assistance in improving collaboration for our patients.    ORDER  SN 1w1, 2w2, 1w2, 3 prn  OT to eval and treat  PT to eval and treat  Okay to irrigate currie catheter with ...ml sterile saline ... Times per day or as needed. Okay for caregiver to perform on non nursing days   DO YOU HAVE RECORD OF AMOUNT OF STERILE SALINE AND FREQUENCY OF IRRIGATION RECOMMENDED AT DISCHARGE?     MD SUMMARY/PLAN OF CARE  SITUATION   Met with patient and his daughter for admission to home care services   BACKGROUND 88 yo male living in single family home with his daughter, son in law and grandchildren. He was admitted to [facility] from 6/11/19 - 6/15/19. Per referral notes, he was \"admitted as consequence of his prior admission on 4/2019 for CAP/UTI , treated with ceftin resulting ln C.diff and requiring readmission from 5/31-6/4/19.  Pt was discharged home with improving diarrhea and ongoing oral vancomycin through 06/12/19. Pt has inability to continue with straight cath and noted obstructive uropathy with bilateral hydronephrosis, bladder wall thickening, currie catheter placed. Pts daughter states pt has very poor oral intake\". Additional medical hx includes HTN, Peripheral artery disease, CKD4, BPH, chronic urinary retention, severe malnutrition, generalized weakness, low urinary output, pacemaker, anemia of chronic disease      ASSESSMENT VS /54 sitting, T 97.4 temporal, P 64 regular, R 19, SaO2 99 percent on RA. Lung sounds clear. SOB with strenuous exertion ie climbing stairs. Denies cough, dizziness, headaches, numbness/tingling. A/O x 3, forgetfulness at " times, but patient is able to make his own decisions. Mood is pleasant and cooperative.  His daughter sets up his medicaiton 2 weeks at a time. Patient takes these without reminders, but does miss a dose once in a while. Left arm and hand is swollen/puffy. Large indentation from his watch. Patient/daughter report that when he was admitted to the hospital, an IV was inserted and the terniquette was incidentally left on for 2-3 hours. Patient/caregiver instructed to notify nurse or doctor if the swelling increases, he develops pain or numbness in the hand/fingers. Plus 2 pitting edema bilat LE. Instructed to elevate legs more often, may consider TEDS. Denies pain. Gait is smooth and steady, but patient feels weak. Tasks take a long time to complete and he has difficulty bending over. Ambulates with cane within his home. He does own a walker and has used that in the past when he leaves the house. Educated to try using the walker seat to carry things inside the house. MAHC10 score 5, denies falls. Ho score 18, skin is dry and intact. Skin is thin and a few bruises are present on bilat arms from IV pokes. Appetite is improving, patient reports. Prior to having currie placed and infection treated, he had difficulty eating and no appetite. Weight today is 157 lb. Patient states this is up 10lb from his hospitalization. He states he is eating more. Nurse suspects this may also be r/t fluid retention in the legs and left arm. Currie catheter inserted in hospital on 6/11 and is draining slightly cloudy dark yellow urine. It is not angel, but is slightly darker than pale yellow. Sedement in the tubing. Caregiver is flushing as needed to prevent clogging of the catheter. Patient to f/u with urologist in 2 weeks to discuss long term plan. Patient is okay with the currie catheter staying in place as his new norm. He reports self cathing as being inconvenient and makes him feel homebound. The currie gives him more freedom.  Educated on the risk of infection and pros and cons of each option. With the insertion of currie cathter, he notices an improvement in his bowel movements as well. Diarrhea has stopped after administration of vanco. He also reports past constipation associated with urinary retention. The past few days he is experiencing daily regular BMs. Education needed on the association between bowel and bladder health as well as nutrition and hydration. Education needed on infection prevention. Education provided on dietary sources of probiotics for GI/ health. He has a good support system in place, living with his family. His family provides grocery shopping, errands, meal prep, transportation, homemaking, assist with ADLs as needed.      RECOMMENDATION  SN to assess and education on GI/ health, hydration and nutrition. Assess and educate on medication management, cardiac status, home safety and infection prevention.  PT to eval and treat for strengthening and endurance. OT to eval and treat for equipment needs and cognitive assessment. Patient declines SW and HHA.  Thank you for this referral    Tianna Escobar RN  [phone]  [email or web address]

## 2019-06-19 NOTE — PROGRESS NOTES
Cam will see urologist in 2 weeks, has not yet been seen since hospital discharge. Instructions given to caregiver are to irrigate with 60ml sterilIe water as needed. She reports irrigated about every other day. This sounds reasonable and is affective.     Okay for home care visit schedule?    Thank you,  Tianna Escobar RN  [phone]

## 2019-06-21 ENCOUNTER — DOCUMENTATION ONLY (OUTPATIENT)
Dept: CARE COORDINATION | Facility: CLINIC | Age: 84
End: 2019-06-21

## 2019-06-21 NOTE — PROGRESS NOTES
[facility] utilizes an encounter to take the place of a direct phone call to your office. Please take a moment to review the below request. Please reply or route message to author of this encounter.  Message will act as a verbal OK of orders requested below. Thank you.    ORDER  PT [facility] completed today 6/21/19, plan to continue 2w3 for gait/transfer training, there ex/hep instruction starting wk of 6/23/19.  Efrain Madrid PT  [phone]

## 2019-06-26 ENCOUNTER — OFFICE VISIT (OUTPATIENT)
Dept: PEDIATRICS | Facility: CLINIC | Age: 84
End: 2019-06-26
Payer: MEDICARE

## 2019-06-26 VITALS
DIASTOLIC BLOOD PRESSURE: 62 MMHG | BODY MASS INDEX: 21.4 KG/M2 | SYSTOLIC BLOOD PRESSURE: 129 MMHG | HEART RATE: 84 BPM | TEMPERATURE: 97.6 F | OXYGEN SATURATION: 100 % | WEIGHT: 144.9 LBS

## 2019-06-26 DIAGNOSIS — D50.9 IRON DEFICIENCY ANEMIA, UNSPECIFIED IRON DEFICIENCY ANEMIA TYPE: ICD-10-CM

## 2019-06-26 DIAGNOSIS — Z09 HOSPITAL DISCHARGE FOLLOW-UP: Primary | ICD-10-CM

## 2019-06-26 DIAGNOSIS — R34 ANURIA AND OLIGURIA: ICD-10-CM

## 2019-06-26 DIAGNOSIS — N17.9 ACUTE KIDNEY INJURY SUPERIMPOSED ON CHRONIC KIDNEY DISEASE (H): ICD-10-CM

## 2019-06-26 DIAGNOSIS — N18.9 ACUTE KIDNEY INJURY SUPERIMPOSED ON CHRONIC KIDNEY DISEASE (H): ICD-10-CM

## 2019-06-26 LAB
ANION GAP SERPL CALCULATED.3IONS-SCNC: 7 MMOL/L (ref 3–14)
BASOPHILS # BLD AUTO: 0 10E9/L (ref 0–0.2)
BASOPHILS NFR BLD AUTO: 0.2 %
BUN SERPL-MCNC: 31 MG/DL (ref 7–30)
CALCIUM SERPL-MCNC: 8.7 MG/DL (ref 8.5–10.1)
CHLORIDE SERPL-SCNC: 107 MMOL/L (ref 94–109)
CO2 SERPL-SCNC: 26 MMOL/L (ref 20–32)
CREAT SERPL-MCNC: 2.49 MG/DL (ref 0.66–1.25)
DIFFERENTIAL METHOD BLD: ABNORMAL
EOSINOPHIL # BLD AUTO: 0.1 10E9/L (ref 0–0.7)
EOSINOPHIL NFR BLD AUTO: 0.9 %
ERYTHROCYTE [DISTWIDTH] IN BLOOD BY AUTOMATED COUNT: 14.6 % (ref 10–15)
FERRITIN SERPL-MCNC: 507 NG/ML (ref 26–388)
GFR SERPL CREATININE-BSD FRML MDRD: 22 ML/MIN/{1.73_M2}
GLUCOSE SERPL-MCNC: 87 MG/DL (ref 70–99)
HCT VFR BLD AUTO: 33.6 % (ref 40–53)
HGB BLD-MCNC: 10.2 G/DL (ref 13.3–17.7)
IMM GRANULOCYTES # BLD: 0.1 10E9/L (ref 0–0.4)
IMM GRANULOCYTES NFR BLD: 0.6 %
IRON SATN MFR SERPL: 22 % (ref 15–46)
IRON SERPL-MCNC: 32 UG/DL (ref 35–180)
LYMPHOCYTES # BLD AUTO: 0.5 10E9/L (ref 0.8–5.3)
LYMPHOCYTES NFR BLD AUTO: 6 %
MCH RBC QN AUTO: 28.3 PG (ref 26.5–33)
MCHC RBC AUTO-ENTMCNC: 30.4 G/DL (ref 31.5–36.5)
MCV RBC AUTO: 93 FL (ref 78–100)
MONOCYTES # BLD AUTO: 0.9 10E9/L (ref 0–1.3)
MONOCYTES NFR BLD AUTO: 11.2 %
NEUTROPHILS # BLD AUTO: 6.6 10E9/L (ref 1.6–8.3)
NEUTROPHILS NFR BLD AUTO: 81.1 %
PLATELET # BLD AUTO: 315 10E9/L (ref 150–450)
POTASSIUM SERPL-SCNC: 4.2 MMOL/L (ref 3.4–5.3)
RBC # BLD AUTO: 3.61 10E12/L (ref 4.4–5.9)
SODIUM SERPL-SCNC: 140 MMOL/L (ref 133–144)
TIBC SERPL-MCNC: 144 UG/DL (ref 240–430)
WBC # BLD AUTO: 8.2 10E9/L (ref 4–11)

## 2019-06-26 PROCEDURE — 83540 ASSAY OF IRON: CPT | Performed by: FAMILY MEDICINE

## 2019-06-26 PROCEDURE — 80048 BASIC METABOLIC PNL TOTAL CA: CPT | Performed by: FAMILY MEDICINE

## 2019-06-26 PROCEDURE — 82728 ASSAY OF FERRITIN: CPT | Performed by: FAMILY MEDICINE

## 2019-06-26 PROCEDURE — 99214 OFFICE O/P EST MOD 30 MIN: CPT | Performed by: FAMILY MEDICINE

## 2019-06-26 PROCEDURE — 83550 IRON BINDING TEST: CPT | Performed by: FAMILY MEDICINE

## 2019-06-26 PROCEDURE — 85025 COMPLETE CBC W/AUTO DIFF WBC: CPT | Performed by: FAMILY MEDICINE

## 2019-06-26 PROCEDURE — 36415 COLL VENOUS BLD VENIPUNCTURE: CPT | Performed by: FAMILY MEDICINE

## 2019-06-26 NOTE — PROGRESS NOTES
Subjective     Cam Gonzalez is a 89 year old male who presents to clinic today for the following health issues:    [facility]       Hospital Follow-up Visit:    Patient is here for follow-up on his hospital discharge after having anuria and oliguria  Patient states he went to the [facility] emergency room due to complete lack of urinary output 1 day before the hospital admission  Patient's daughter tried to flush his indwelling catheter with no improvement  Patient was diagnosed with urinary retention and acute kidney injury on chronic kidney disease  He had an ultrasound showing significant bilateral hydronephrosis that was worsening since his last scanning on March 31.  Patient was seen by inpatient neurology, had a Hill replaced and noted his urine output stabilizing over the course of his hospital stay for 4 days.  His renal function has also improved to patient's baseline  Patient denies concerns for anuria, oliguria, fever, chills, low back or flank pain, nausea, vomiting, abdominal pain or bloating      Hospital/Nursing Home/IP Rehab Facility: [facility]  Date of Admission: 6/11/19  Date of Discharge: 6/15/19  Reason(s) for Admission: anuria and oliguria            Problems taking medications regularly:  None       Medication changes since discharge: None       Problems adhering to non-medication therapy:  None    Summary of hospitalization:  [facility] discharge summary reviewed  Diagnostic Tests/Treatments reviewed.  Follow up needed: Labs  Other Healthcare Providers Involved in Patient s Care:         [facility] urology  Update since discharge: stable.     Post Discharge Medication Reconciliation: discharge medications reconciled, continue medications without change.  Plan of care communicated with patient, patient's daughter     Coding guidelines for this visit:  Type of Medical   Decision Making Face-to-Face Visit       within 7 Days of discharge Face-to-Face Visit         within 14 days of discharge   Moderate Complexity [id number]   High Complexity [id number]                Patient Active Problem List   Diagnosis     Renal failure     Syncope, unspecified syncope type     Acute renal failure, unspecified acute renal failure type (H)     Bilateral hydronephrosis     Asymptomatic cholelithiasis     Enlarged prostate     Mild concentric left ventricular hypertrophy (LVH)     Anemia of chronic disease     Vitamin D deficiency     Urinary retention     Elevated blood pressure reading without diagnosis of hypertension     Carotid stenosis, asymptomatic, bilateral     Sinus bradycardia     HTN (hypertension)     Second degree AV block, Mobitz type II     S/P placement of cardiac pacemaker     Syncope and collapse     CKD (chronic kidney disease) stage 4, GFR 15-29 ml/min (H)     Obstructive uropathy     ACP (advance care planning)     Hill catheter in place     S/P TURP (status post transurethral resection of prostate)     PAD (peripheral artery disease) (H)     Nail dystrophy     Urinary retention with incomplete bladder emptying     Colitis due to Clostridium difficile     Bladder wall thickening     Iron deficiency anemia, unspecified iron deficiency anemia type     Acute kidney injury superimposed on chronic kidney disease (H)     No past surgical history on file.    Social History     Tobacco Use     Smoking status: Former Smoker     Last attempt to quit: 1990     Years since quittin.5     Smokeless tobacco: Never Used   Substance Use Topics     Alcohol use: No     Alcohol/week: 0.0 oz     Comment: rarely has a beer     Family History   Problem Relation Age of Onset     Prostate Cancer Father          Current Outpatient Medications   Medication Sig Dispense Refill     aspirin 81 MG EC tablet Take 81 mg by mouth daily       Cholecalciferol (VITAMIN D3 PO) Take 2,000 Units by mouth daily       ferrous sulfate (FEROSUL) 325 (65 Fe) MG tablet TAKE 1 TABLET BY MOUTH EVERY  OTHER DAY 30 tablet 3     order for DME Equipment being ordered: self catheter supplies 30 Units 0     Probiotic Product (PROBIOTIC DAILY PO)        No Known Allergies  Recent Labs   Lab Test 06/26/19  1539 06/11/19  1158  05/31/18  1013  09/21/16  1450  02/07/16  0606 02/07/16  0050 02/06/16  1450   ALT  --   --   --  18  --  41  --   --   --  16   CR 2.49* 2.72*   < > 3.03*   < > 3.35*   < > 4.93* 4.99* 4.95*   GFRESTIMATED 22* 20*   < > 20*   < > 18*   < > 11* 11* 11*   GFRESTBLACK 25* 23*   < > 24*   < > 21*   < > 14* 13* 14*   POTASSIUM 4.2 4.3   < > 4.6   < > 3.8   < > 4.8  --  4.6   TSH  --   --   --   --   --   --   --  Canceled, Test credited   Duplicate request   Called to  [facility] [address], SEE [id number] FOR RESULT   2.30  --     < > = values in this interval not displayed.      BP Readings from Last 3 Encounters:   06/26/19 129/62   06/11/19 117/70   05/08/19 110/65    Wt Readings from Last 3 Encounters:   06/26/19 65.7 kg (144 lb 14.4 oz)   06/11/19 67.2 kg (148 lb 1.6 oz)   04/26/19 67.7 kg (149 lb 4.8 oz)                      Reviewed and updated as needed this visit by Provider         Review of Systems   ROS COMP: CONSTITUTIONAL: NEGATIVE for fever, chills, change in weight  RESP: NEGATIVE for significant cough or SOB  CV: NEGATIVE for chest pain, palpitations or peripheral edema  GI: NEGATIVE for nausea, abdominal pain, heartburn, or change in bowel habits  : as above  PSYCHIATRIC: NEGATIVE for changes in mood or affect      Objective    /62 (BP Location: Right arm, Patient Position: Sitting, Cuff Size: Adult Regular)   Pulse 84   Temp 97.6  F (36.4  C) (Oral)   Wt 65.7 kg (144 lb 14.4 oz)   SpO2 100%   BMI 21.40 kg/m    Body mass index is 21.4 kg/m .  Physical Exam   GENERAL: healthy, alert and no distress  RESP: lungs clear to auscultation - no rales, rhonchi or wheezes  CV: regular rate and rhythm, normal S1 S2, no S3 or S4, no murmur, click or rub, no peripheral edema and  peripheral pulses strong  ABDOMEN: soft, nontender, no hepatosplenomegaly, no masses and bowel sounds normal  PSYCH: mentation appears normal, affect normal/bright    Diagnostic Test Results:  Labs reviewed in Epic  Results for orders placed or performed in visit on 06/26/19 (from the past 24 hour(s))   CBC with platelets and differential   Result Value Ref Range    WBC 8.2 4.0 - 11.0 10e9/L    RBC Count 3.61 (L) 4.4 - 5.9 10e12/L    Hemoglobin 10.2 (L) 13.3 - 17.7 g/dL    Hematocrit 33.6 (L) 40.0 - 53.0 %    MCV 93 78 - 100 fl    MCH 28.3 26.5 - 33.0 pg    MCHC 30.4 (L) 31.5 - 36.5 g/dL    RDW 14.6 10.0 - 15.0 %    Platelet Count 315 150 - 450 10e9/L    Diff Method Automated Method     % Neutrophils 81.1 %    % Lymphocytes 6.0 %    % Monocytes 11.2 %    % Eosinophils 0.9 %    % Basophils 0.2 %    % Immature Granulocytes 0.6 %    Absolute Neutrophil 6.6 1.6 - 8.3 10e9/L    Absolute Lymphocytes 0.5 (L) 0.8 - 5.3 10e9/L    Absolute Monocytes 0.9 0.0 - 1.3 10e9/L    Absolute Eosinophils 0.1 0.0 - 0.7 10e9/L    Absolute Basophils 0.0 0.0 - 0.2 10e9/L    Abs Immature Granulocytes 0.1 0 - 0.4 10e9/L   Ferritin   Result Value Ref Range    Ferritin 507 (H) 26 - 388 ng/mL   Iron and iron binding capacity   Result Value Ref Range    Iron 32 (L) 35 - 180 ug/dL    Iron Binding Cap 144 (L) 240 - 430 ug/dL    Iron Saturation Index 22 15 - 46 %   Basic metabolic panel  (Ca, Cl, CO2, Creat, Gluc, K, Na, BUN)   Result Value Ref Range    Sodium 140 133 - 144 mmol/L    Potassium 4.2 3.4 - 5.3 mmol/L    Chloride 107 94 - 109 mmol/L    Carbon Dioxide 26 20 - 32 mmol/L    Anion Gap 7 3 - 14 mmol/L    Glucose 87 70 - 99 mg/dL    Urea Nitrogen 31 (H) 7 - 30 mg/dL    Creatinine 2.49 (H) 0.66 - 1.25 mg/dL    GFR Estimate 22 (L) >60 mL/min/[1.73_m2]    GFR Estimate If Black 25 (L) >60 mL/min/[1.73_m2]    Calcium 8.7 8.5 - 10.1 mg/dL           Assessment & Plan     1. Hospital discharge follow-up  Reviewed documents and reports from [facility]  everywhere  Reviewed worsening kidney functions, low potassium of 3.4 and low hemoglobin of 8.7 g at the time of hospital discharge  Labs were repeated showing improved hemoglobin of 10.2 g and normal potassium of 4.2  He will continue with iron supplements every other day  Patient has appointment scheduled next week follow-up with your regular urologist Dr. Perez  - CBC with platelets and differential  - Ferritin  - Iron and iron binding capacity  - Basic metabolic panel  (Ca, Cl, CO2, Creat, Gluc, K, Na, BUN)    2. Anuria and oliguria  Resolved, continue with Hill and flushing as recommended  - CBC with platelets and differential  - Ferritin  - Iron and iron binding capacity  - Basic metabolic panel  (Ca, Cl, CO2, Creat, Gluc, K, Na, BUN)    3. Acute kidney injury superimposed on chronic kidney disease (H)  as above    - CBC with platelets and differential  - Basic metabolic panel  (Ca, Cl, CO2, Creat, Gluc, K, Na, BUN)    4. Iron deficiency anemia, unspecified iron deficiency anemia type  Hemoglobin   Date Value Ref Range Status   06/26/2019 10.2 (L) 13.3 - 17.7 g/dL Final   06/11/2019 10.8 (L) 13.3 - 17.7 g/dL Final     as above    - CBC with platelets and differential  - Ferritin  - Iron and iron binding capacity       Chart documentation done in part with Dragon Voice recognition Software. Although reviewed after completion, some word and grammatical error may remain.    See Patient Instructions    No follow-ups on file.    Jaqcuelin Cueto MD  [facility]

## 2019-06-26 NOTE — RESULT ENCOUNTER NOTE
Please inform daughter of lab results-improved hemoglobin at 102 g, will continue with current dose of iron every other day  Kidney functions stable and improved since his hospital discharge  Continue to monitor

## 2019-06-27 NOTE — PROGRESS NOTES
[facility] Call Center    Phone Message    May a detailed message be left on voicemail: yes    Reason for Call: Other: [place] homecare nurse would like to see patient for 4 visits in the next 3 weeks to address activitiy time, adl assistance and saftey. Please advise.     Action Taken: Message routed to:  Primary Care p [phone]

## 2019-06-28 DIAGNOSIS — Z53.9 DIAGNOSIS NOT YET DEFINED: Primary | ICD-10-CM

## 2019-06-28 PROCEDURE — G0180 MD CERTIFICATION HHA PATIENT: HCPCS | Performed by: FAMILY MEDICINE

## 2019-07-01 ENCOUNTER — TRANSFERRED RECORDS (OUTPATIENT)
Dept: HEALTH INFORMATION MANAGEMENT | Facility: CLINIC | Age: 84
End: 2019-07-01

## 2019-07-11 ENCOUNTER — TRANSFERRED RECORDS (OUTPATIENT)
Dept: HEALTH INFORMATION MANAGEMENT | Facility: CLINIC | Age: 84
End: 2019-07-11

## 2019-07-13 NOTE — MR AVS SNAPSHOT
After Visit Summary   2017    Cam Gonzalez    MRN: [id number]           Patient Information     Date Of Birth          1930        Visit Information        Provider Department      2017 3:40 PM Mati Hurst MD [facility]        Today's Diagnoses     Clostridium difficile infection    -  1       Follow-ups after your visit        Who to contact     If you have questions or need follow up information about today's clinic visit or your schedule please contact [facility] directly at [phone].  Normal or non-critical lab and imaging results will be communicated to you by [employer], letter or phone within 4 business days after the clinic has received the results. If you do not hear from us within 7 days, please contact the clinic through [employer] or phone. If you have a critical or abnormal lab result, we will notify you by phone as soon as possible.  Submit refill requests through [employer] or call your pharmacy and they will forward the refill request to us. Please allow 3 business days for your refill to be completed.          Additional Information About Your Visit        MyChart Information     [employer] is an electronic gateway that provides easy, online access to your medical records. With [employer], you can request a clinic appointment, read your test results, renew a prescription or communicate with your care team.     To sign up for [employer] visit the website at [email or web address]   You will be asked to enter the access code listed below, as well as some personal information. Please follow the directions to create your username and password.     Your access code is: [email or web address]  Expires: 2017  9:37 AM     Your access code will  in 90 days. If you need help or a new code, please contact your [facility] or call [phone] for assistance.        Care EveryWhere ID     This is your Care  "EveryWhere ID. This could be used by other organizations to access your [facility] medical records  [id number]        Your Vitals Were     Pulse Temperature Height Pulse Oximetry BMI (Body Mass Index)       106 97.9  F (36.6  C) (Temporal) 5' 8.5\" (1.74 m) 98% 23.9 kg/m2        Blood Pressure from Last 3 Encounters:   04/11/17 124/83   03/23/17 114/73   01/19/17 117/71    Weight from Last 3 Encounters:   04/11/17 159 lb 8 oz (72.3 kg)   03/23/17 160 lb 8 oz (72.8 kg)   01/19/17 166 lb (75.3 kg)              Today, you had the following     No orders found for display       Primary Care Provider Office Phone # Fax #    Jacquelin Cueto [phone]       [facility] [id number] [facility] [address]        Thank you!     Thank you for choosing [facility]  for your care. Our goal is always to provide you with excellent care. Hearing back from our patients is one way we can continue to improve our services. Please take a few minutes to complete the written survey that you may receive in the mail after your visit with us. Thank you!             Your Updated Medication List - Protect others around you: Learn how to safely use, store and throw away your medicines at www.disposemymeds.org.          This list is accurate as of: 4/11/17  4:21 PM.  Always use your most recent med list.                   Brand Name Dispense Instructions for use    ferrous sulfate 325 (65 FE) MG tablet    IRON    90 tablet    Take 1 tablet (325 mg) by mouth every other day       VITAMIN D3 PO      Take 2,000 Units by mouth daily         " Home

## 2019-07-19 ENCOUNTER — TELEPHONE (OUTPATIENT)
Dept: PEDIATRICS | Facility: CLINIC | Age: 84
End: 2019-07-19

## 2019-07-24 ENCOUNTER — DOCUMENTATION ONLY (OUTPATIENT)
Dept: CARE COORDINATION | Facility: CLINIC | Age: 84
End: 2019-07-24

## 2019-07-24 NOTE — PROGRESS NOTES
[facility] now requests orders and shares plan of care/discharge summaries for some patients through [employer].  Please REPLY TO THIS MESSAGE OR ROUTE BACK TO THE AUTHOR in order to give authorization for orders when needed.  This is considered a verbal order, you will still receive a faxed copy of orders for signature.  Thank you for your assistance in improving collaboration for our patients.    ORDER UPDATE    PT eval attempted this week. Patient wishes to wait until next week for PT eval. Requesting okay for PT eval to be performed week of 7/28/19.    Thank you,   Juanita Tang, PT

## 2019-07-29 ENCOUNTER — TELEPHONE (OUTPATIENT)
Dept: PEDIATRICS | Facility: CLINIC | Age: 84
End: 2019-07-29

## 2019-07-29 NOTE — TELEPHONE ENCOUNTER
[facility] Call Center    Phone Message    May a detailed message be left on voicemail: yes    Reason for Call: Per daughter BETO LEUNG pt admitted to hospital for C-Diff. Please be advised.      Action Taken: Message routed to:  Primary Care p [phone]

## 2019-08-16 ENCOUNTER — DOCUMENTATION ONLY (OUTPATIENT)
Dept: CARE COORDINATION | Facility: CLINIC | Age: 84
End: 2019-08-16

## 2019-08-16 NOTE — PROGRESS NOTES
..[facility] now requests orders and shares plan of care/discharge summaries for some patients through [employer].  Please REPLY TO THIS MESSAGE OR ROUTE BACK TO THE AUTHOR in order to give authorization for orders when needed.  This is considered a verbal order, you will still receive a faxed copy of orders for signature.  Thank you for your assistance in improving collaboration for our patients.    ORDER  SN 1w1   Request for late recertification week of 8/19 per patient request, as he will be out of town with family.     Thank you,  Tianna Escobar RN  [phone]    Agree with above orders.  Raleigh Ramirez MD

## 2019-08-20 NOTE — PROGRESS NOTES
Tianna RN calls back, relayed message & also gave re-certification orders and she verbalized understanding.   Jasmin Rivero RN

## 2019-08-21 NOTE — PROGRESS NOTES
Tianna RN calls stating she saw patient and wants orders for 1x week for 2 weeks and then every other week for 2 weeks with a goal of once monthly for a cath change. Orders given.   Jasmin Rivero RN

## 2019-08-30 ENCOUNTER — DOCUMENTATION ONLY (OUTPATIENT)
Dept: CARE COORDINATION | Facility: CLINIC | Age: 84
End: 2019-08-30

## 2019-08-30 NOTE — PROGRESS NOTES
Dear Dr. Cueto  Medicare Home Health regulations requires [facility] to provide an initial assessment visit either within 48 hours of the patient's return home, or on the physician ordered Start of Care date.    There will be a delay in the Initial Assessment for Cam Gonzalez; MRN [id number]    Patient and family will be unavailable for a home care visit until Wednesday or Thursday, (9/4 or 9/5/19) due to the holiday weekend.  Please authorize new home care orders for nursing to see Mr. Gonzalez on this delayed date due to patient request. Thanks!      Sincerely [facility]  Rosemarie Vogel RN  [phone]

## 2019-09-06 DIAGNOSIS — Z53.9 DIAGNOSIS NOT YET DEFINED: Primary | ICD-10-CM

## 2019-09-06 PROCEDURE — G0179 MD RECERTIFICATION HHA PT: HCPCS | Performed by: FAMILY MEDICINE

## 2019-09-20 ENCOUNTER — OFFICE VISIT (OUTPATIENT)
Dept: PEDIATRICS | Facility: CLINIC | Age: 84
End: 2019-09-20
Payer: MEDICARE

## 2019-09-20 VITALS
SYSTOLIC BLOOD PRESSURE: 107 MMHG | DIASTOLIC BLOOD PRESSURE: 59 MMHG | TEMPERATURE: 97.4 F | HEART RATE: 60 BPM | WEIGHT: 136.3 LBS | BODY MASS INDEX: 20.13 KG/M2 | OXYGEN SATURATION: 96 %

## 2019-09-20 DIAGNOSIS — Z53.9 ERRONEOUS ENCOUNTER--DISREGARD: Primary | ICD-10-CM

## 2019-09-20 RX ORDER — VANCOMYCIN HYDROCHLORIDE 125 MG/1
CAPSULE ORAL
Refills: 0 | COMMUNITY
Start: 2019-08-21

## 2019-09-20 ASSESSMENT — PAIN SCALES - GENERAL: PAINLEVEL: NO PAIN (0)

## 2019-09-20 NOTE — PROGRESS NOTES
Patient's daughter who came with patient for the hospital discharge follow-up visit today wanted to cancel the office visit and to take him to the [facility] due to patient's concerns with hematuria and foul-smelling urine  Patient had history of frequent UTI and a frequency of infections in the past year, needing hospital admissions for IV antibiotics and monitoring  Patient's daughter feels safe to drive him to the [facility] right away today

## 2019-10-03 ENCOUNTER — TRANSFERRED RECORDS (OUTPATIENT)
Dept: HEALTH INFORMATION MANAGEMENT | Facility: CLINIC | Age: 84
End: 2019-10-03

## 2019-10-31 ENCOUNTER — MEDICAL CORRESPONDENCE (OUTPATIENT)
Dept: HEALTH INFORMATION MANAGEMENT | Facility: CLINIC | Age: 84
End: 2019-10-31

## 2019-11-10 ENCOUNTER — TRANSFERRED RECORDS (OUTPATIENT)
Dept: HEALTH INFORMATION MANAGEMENT | Facility: CLINIC | Age: 84
End: 2019-11-10

## 2019-11-19 ENCOUNTER — TELEPHONE (OUTPATIENT)
Dept: PEDIATRICS | Facility: CLINIC | Age: 84
End: 2019-11-19

## 2019-11-19 NOTE — TELEPHONE ENCOUNTER
"[facility] Call Center    Phone Message    May a detailed message be left on voicemail: yes    Reason for Call: Other: Hospital called to schedule a hospital follow up for patient - pt has been scheduled on 11/26. RN schedule states \"UNAVAILABLE\" and \"DO NOT USE\" Please schedule if possible.     Action Taken: Message routed to:  Primary Care p [phone]  "

## 2019-11-20 NOTE — TELEPHONE ENCOUNTER
Called daughter Meagan and left a detailed message in regards to seeing the RN[facility] 20 min prior to appointment for med rec. Advised to bring all medications and all discharge paperwork to visit. Encouraged a call back to esure this will work for the family and that there are no current / immediate needs.    Mahnaz Saeed, RN, BSN, PHN  [facility]

## 2019-11-22 NOTE — TELEPHONE ENCOUNTER
Patient daughter called wanting to know if pt really needs to be on antibiotic for UTI due to pt having history of c-diff. Please advise

## 2019-11-22 NOTE — TELEPHONE ENCOUNTER
Called daughter again, advised to call back if this appointment time does not work for them or if they have concerns.     Mahnaz Saeed RN, BSN, PHN  [facility]

## 2019-11-23 NOTE — TELEPHONE ENCOUNTER
If that's what the hospitalist advised during the admission, yes, he needs to be on it.for UTI  If he develops diarrhea, will stop abx and call or f/u

## 2019-11-25 NOTE — TELEPHONE ENCOUNTER
Called patient's daughter.   Gave her message from Dr. Cueto    Meagan Gil states he did develop diarrhea while on the antibiotic so she as already stopped it.  She states he is better now.    He was just moved into an assisted living, he may able to be seen by the house provider.  She is finding out today and may end up canceling the appt. tomorrow with Dr. Cueto.      Routing as FYI for Dr. Cueto.    Maame Carlos RN, [facility]

## 2019-11-27 ENCOUNTER — TELEPHONE (OUTPATIENT)
Dept: PEDIATRICS | Facility: CLINIC | Age: 84
End: 2019-11-27

## 2019-11-27 NOTE — TELEPHONE ENCOUNTER
[facility] Call Center    Phone Message    May a detailed message be left on voicemail: yes    Reason for Call: Other: Jacey is requesting verbal orders for OT 2x a week for 3 weeks and 1x a week for 1 week.    Ok to leave message.   [phone]    Action Taken: Message routed to:  Primary Care p [phone]

## 2019-11-29 NOTE — TELEPHONE ENCOUNTER
Message left with authorization for OT orders as requested.  Requested return call if further questions/concerns.      Maryan Iqbal RN

## 2019-12-04 ENCOUNTER — TELEPHONE (OUTPATIENT)
Dept: PEDIATRICS | Facility: CLINIC | Age: 84
End: 2019-12-04

## 2019-12-04 NOTE — TELEPHONE ENCOUNTER
Forms faxed to [phone]. Forms placed to be scanned into chart. Maria Del Carmen CORDERO CMA

## 2019-12-04 NOTE — TELEPHONE ENCOUNTER
[facility] CLINICAL DOCUMENTATION    Form Documentation Form or Letter Request    Type or form/letter needing completion: Face-to-Face Documentation  Provider: Jacquelin Cueto  Has provider seen patient for office visit related to reason for form request? last OV 9/20/19  Date form needed: Not Indicated  Once completed: Fax form to: [phone]     Form placed on Dr. Cueto's desk for review. Maria Del Carmen CORDERO, CMA

## 2020-01-01 ENCOUNTER — RECORDS - HEALTHEAST (OUTPATIENT)
Dept: LAB | Facility: CLINIC | Age: 85
End: 2020-01-01

## 2020-01-01 LAB
ERYTHROCYTE [DISTWIDTH] IN BLOOD BY AUTOMATED COUNT: 15.6 % (ref 11–14.5)
HCT VFR BLD AUTO: 30.9 % (ref 40–54)
HGB BLD-MCNC: 9.2 G/DL (ref 14–18)
MCH RBC QN AUTO: 28.3 PG (ref 27–34)
MCHC RBC AUTO-ENTMCNC: 29.8 G/DL (ref 32–36)
MCV RBC AUTO: 95 FL (ref 80–100)
PLATELET # BLD AUTO: 258 THOU/UL (ref 140–440)
PMV BLD AUTO: 10.6 FL (ref 8.5–12.5)
RBC # BLD AUTO: 3.25 MILL/UL (ref 4.4–6.2)
WBC: 11.9 THOU/UL (ref 4–11)